# Patient Record
Sex: MALE | Race: WHITE | NOT HISPANIC OR LATINO
[De-identification: names, ages, dates, MRNs, and addresses within clinical notes are randomized per-mention and may not be internally consistent; named-entity substitution may affect disease eponyms.]

---

## 2017-05-16 ENCOUNTER — RECORDS - HEALTHEAST (OUTPATIENT)
Dept: ADMINISTRATIVE | Facility: OTHER | Age: 67
End: 2017-05-16

## 2017-05-16 ENCOUNTER — RECORDS - HEALTHEAST (OUTPATIENT)
Dept: LAB | Facility: CLINIC | Age: 67
End: 2017-05-16

## 2017-05-16 LAB
CHOLEST SERPL-MCNC: 129 MG/DL
FASTING STATUS PATIENT QL REPORTED: ABNORMAL
HDLC SERPL-MCNC: 32 MG/DL
LDLC SERPL CALC-MCNC: 72 MG/DL
TRIGL SERPL-MCNC: 127 MG/DL

## 2017-05-23 ENCOUNTER — COMMUNICATION - HEALTHEAST (OUTPATIENT)
Dept: CARDIOLOGY | Facility: HOSPITAL | Age: 67
End: 2017-05-23

## 2017-05-23 ENCOUNTER — HOSPITAL ENCOUNTER (OUTPATIENT)
Dept: NUCLEAR MEDICINE | Facility: HOSPITAL | Age: 67
Discharge: HOME | End: 2017-05-23
Attending: FAMILY MEDICINE

## 2017-05-23 ENCOUNTER — HOSPITAL ENCOUNTER (OUTPATIENT)
Dept: CARDIOLOGY | Facility: HOSPITAL | Age: 67
Discharge: HOME | End: 2017-05-23
Attending: FAMILY MEDICINE

## 2017-05-23 ENCOUNTER — OFFICE VISIT - HEALTHEAST (OUTPATIENT)
Dept: CARDIOLOGY | Facility: HOSPITAL | Age: 67
End: 2017-05-23

## 2017-05-23 DIAGNOSIS — R06.09 DOE (DYSPNEA ON EXERTION): ICD-10-CM

## 2017-05-24 ENCOUNTER — RECORDS - HEALTHEAST (OUTPATIENT)
Dept: NUCLEAR MEDICINE | Facility: HOSPITAL | Age: 67
End: 2017-05-24

## 2017-05-24 ENCOUNTER — COMMUNICATION - HEALTHEAST (OUTPATIENT)
Dept: CARDIOLOGY | Facility: CLINIC | Age: 67
End: 2017-05-24

## 2017-05-24 DIAGNOSIS — R06.09 OTHER FORMS OF DYSPNEA: ICD-10-CM

## 2017-05-24 DIAGNOSIS — I25.10 CAD (CORONARY ARTERY DISEASE): ICD-10-CM

## 2017-05-30 ENCOUNTER — AMBULATORY - HEALTHEAST (OUTPATIENT)
Dept: CARDIOLOGY | Facility: CLINIC | Age: 67
End: 2017-05-30

## 2017-05-31 ENCOUNTER — OFFICE VISIT - HEALTHEAST (OUTPATIENT)
Dept: CARDIOLOGY | Facility: CLINIC | Age: 67
End: 2017-05-31

## 2017-05-31 ENCOUNTER — COMMUNICATION - HEALTHEAST (OUTPATIENT)
Dept: CARDIOLOGY | Facility: CLINIC | Age: 67
End: 2017-05-31

## 2017-05-31 DIAGNOSIS — R06.09 DYSPNEA ON EXERTION: ICD-10-CM

## 2017-05-31 DIAGNOSIS — I48.20 CHRONIC ATRIAL FIBRILLATION (H): ICD-10-CM

## 2017-05-31 DIAGNOSIS — R07.9 CHEST PAIN, UNSPECIFIED TYPE: ICD-10-CM

## 2017-05-31 DIAGNOSIS — I10 ESSENTIAL HYPERTENSION WITH GOAL BLOOD PRESSURE LESS THAN 130/80: ICD-10-CM

## 2017-05-31 LAB
ATRIAL RATE - MUSE: NORMAL BPM
DIASTOLIC BLOOD PRESSURE - MUSE: NORMAL MMHG
INTERPRETATION ECG - MUSE: NORMAL
P AXIS - MUSE: NORMAL DEGREES
PR INTERVAL - MUSE: NORMAL MS
QRS DURATION - MUSE: 138 MS
QT - MUSE: 390 MS
QTC - MUSE: 479 MS
R AXIS - MUSE: -48 DEGREES
SYSTOLIC BLOOD PRESSURE - MUSE: NORMAL MMHG
T AXIS - MUSE: -40 DEGREES
VENTRICULAR RATE- MUSE: 91 BPM

## 2017-05-31 ASSESSMENT — MIFFLIN-ST. JEOR: SCORE: 1731.05

## 2017-06-01 ENCOUNTER — HOSPITAL ENCOUNTER (OUTPATIENT)
Dept: CARDIOLOGY | Facility: HOSPITAL | Age: 67
Discharge: HOME | End: 2017-06-01
Attending: NURSE PRACTITIONER

## 2017-06-01 DIAGNOSIS — R06.09 DYSPNEA ON EXERTION: ICD-10-CM

## 2017-06-01 DIAGNOSIS — I48.20 CHRONIC ATRIAL FIBRILLATION (H): ICD-10-CM

## 2017-06-01 DIAGNOSIS — R06.09 OTHER FORMS OF DYSPNEA: ICD-10-CM

## 2017-06-06 ENCOUNTER — AMBULATORY - HEALTHEAST (OUTPATIENT)
Dept: CARDIOLOGY | Facility: CLINIC | Age: 67
End: 2017-06-06

## 2017-06-06 ENCOUNTER — OFFICE VISIT - HEALTHEAST (OUTPATIENT)
Dept: CARDIOLOGY | Facility: CLINIC | Age: 67
End: 2017-06-06

## 2017-06-06 DIAGNOSIS — E78.5 DYSLIPIDEMIA: ICD-10-CM

## 2017-06-06 DIAGNOSIS — I48.0 PAROXYSMAL ATRIAL FIBRILLATION (H): ICD-10-CM

## 2017-06-06 DIAGNOSIS — R07.2 PRECORDIAL PAIN: ICD-10-CM

## 2017-06-06 DIAGNOSIS — R06.09 DYSPNEA ON EXERTION: ICD-10-CM

## 2017-06-06 DIAGNOSIS — I10 ESSENTIAL HYPERTENSION WITH GOAL BLOOD PRESSURE LESS THAN 140/90: ICD-10-CM

## 2017-06-06 ASSESSMENT — MIFFLIN-ST. JEOR: SCORE: 1734.22

## 2017-06-07 ENCOUNTER — SURGERY - HEALTHEAST (OUTPATIENT)
Dept: CARDIOLOGY | Facility: CLINIC | Age: 67
End: 2017-06-07

## 2017-06-07 ENCOUNTER — AMBULATORY - HEALTHEAST (OUTPATIENT)
Dept: CARDIOLOGY | Facility: CLINIC | Age: 67
End: 2017-06-07

## 2017-06-14 ENCOUNTER — SURGERY - HEALTHEAST (OUTPATIENT)
Dept: CARDIOLOGY | Facility: CLINIC | Age: 67
End: 2017-06-14

## 2017-06-14 ENCOUNTER — AMBULATORY - HEALTHEAST (OUTPATIENT)
Dept: CARDIOLOGY | Facility: CLINIC | Age: 67
End: 2017-06-14

## 2017-06-14 ENCOUNTER — COMMUNICATION - HEALTHEAST (OUTPATIENT)
Dept: CARDIOLOGY | Facility: CLINIC | Age: 67
End: 2017-06-14

## 2017-06-14 DIAGNOSIS — I48.19 PERSISTENT ATRIAL FIBRILLATION (H): ICD-10-CM

## 2017-06-14 DIAGNOSIS — I48.91 ATRIAL FIBRILLATION (H): ICD-10-CM

## 2017-06-14 DIAGNOSIS — I25.10 CORONARY ARTERY ARTERIOSCLEROSIS: ICD-10-CM

## 2017-06-14 ASSESSMENT — MIFFLIN-ST. JEOR: SCORE: 1744.2

## 2017-06-16 ENCOUNTER — OFFICE VISIT - HEALTHEAST (OUTPATIENT)
Dept: CARDIOLOGY | Facility: CLINIC | Age: 67
End: 2017-06-16

## 2017-06-16 ENCOUNTER — AMBULATORY - HEALTHEAST (OUTPATIENT)
Dept: CARDIOLOGY | Facility: CLINIC | Age: 67
End: 2017-06-16

## 2017-06-16 DIAGNOSIS — L50.9 FULL BODY HIVES: ICD-10-CM

## 2017-06-16 DIAGNOSIS — I48.91 ATRIAL FIBRILLATION (H): ICD-10-CM

## 2017-06-16 LAB
ATRIAL RATE - MUSE: 101 BPM
DIASTOLIC BLOOD PRESSURE - MUSE: NORMAL MMHG
INTERPRETATION ECG - MUSE: NORMAL
P AXIS - MUSE: NORMAL DEGREES
PR INTERVAL - MUSE: NORMAL MS
QRS DURATION - MUSE: 136 MS
QT - MUSE: 354 MS
QTC - MUSE: 444 MS
R AXIS - MUSE: -62 DEGREES
SYSTOLIC BLOOD PRESSURE - MUSE: NORMAL MMHG
T AXIS - MUSE: -54 DEGREES
VENTRICULAR RATE- MUSE: 95 BPM

## 2017-06-16 ASSESSMENT — MIFFLIN-ST. JEOR
SCORE: 1752.37
SCORE: 1752.37

## 2017-07-06 ENCOUNTER — COMMUNICATION - HEALTHEAST (OUTPATIENT)
Dept: CARDIOLOGY | Facility: CLINIC | Age: 67
End: 2017-07-06

## 2017-07-06 ENCOUNTER — OFFICE VISIT - HEALTHEAST (OUTPATIENT)
Dept: CARDIOLOGY | Facility: CLINIC | Age: 67
End: 2017-07-06

## 2017-07-06 DIAGNOSIS — I48.19 PERSISTENT ATRIAL FIBRILLATION (H): ICD-10-CM

## 2017-07-06 DIAGNOSIS — I10 ESSENTIAL HYPERTENSION WITH GOAL BLOOD PRESSURE LESS THAN 140/90: ICD-10-CM

## 2017-07-06 DIAGNOSIS — I25.10 CORONARY ARTERY DISEASE INVOLVING NATIVE CORONARY ARTERY OF NATIVE HEART WITHOUT ANGINA PECTORIS: ICD-10-CM

## 2017-07-06 ASSESSMENT — MIFFLIN-ST. JEOR: SCORE: 1752.37

## 2017-07-07 ENCOUNTER — AMBULATORY - HEALTHEAST (OUTPATIENT)
Dept: CARDIOLOGY | Facility: CLINIC | Age: 67
End: 2017-07-07

## 2017-07-13 ENCOUNTER — ANESTHESIA - HEALTHEAST (OUTPATIENT)
Dept: CARDIOLOGY | Facility: CLINIC | Age: 67
End: 2017-07-13

## 2017-07-21 ENCOUNTER — COMMUNICATION - HEALTHEAST (OUTPATIENT)
Dept: CARDIOLOGY | Facility: CLINIC | Age: 67
End: 2017-07-21

## 2017-07-25 ENCOUNTER — HOSPITAL ENCOUNTER (OUTPATIENT)
Dept: CARDIOLOGY | Facility: HOSPITAL | Age: 67
Discharge: HOME | End: 2017-07-25
Attending: INTERNAL MEDICINE

## 2017-07-25 DIAGNOSIS — I48.19 PERSISTENT ATRIAL FIBRILLATION (H): ICD-10-CM

## 2017-08-10 ENCOUNTER — COMMUNICATION - HEALTHEAST (OUTPATIENT)
Dept: CARDIOLOGY | Facility: CLINIC | Age: 67
End: 2017-08-10

## 2017-08-11 ENCOUNTER — OFFICE VISIT - HEALTHEAST (OUTPATIENT)
Dept: CARDIOLOGY | Facility: CLINIC | Age: 67
End: 2017-08-11

## 2017-08-11 DIAGNOSIS — I48.19 PERSISTENT ATRIAL FIBRILLATION (H): ICD-10-CM

## 2017-08-11 DIAGNOSIS — E78.5 DYSLIPIDEMIA: ICD-10-CM

## 2017-08-11 DIAGNOSIS — I25.10 CORONARY ARTERY DISEASE INVOLVING NATIVE CORONARY ARTERY OF NATIVE HEART WITHOUT ANGINA PECTORIS: ICD-10-CM

## 2017-08-11 DIAGNOSIS — I10 ESSENTIAL HYPERTENSION WITH GOAL BLOOD PRESSURE LESS THAN 140/90: ICD-10-CM

## 2017-08-11 ASSESSMENT — MIFFLIN-ST. JEOR: SCORE: 1775.05

## 2017-08-24 ENCOUNTER — COMMUNICATION - HEALTHEAST (OUTPATIENT)
Dept: CARDIOLOGY | Facility: CLINIC | Age: 67
End: 2017-08-24

## 2017-08-24 DIAGNOSIS — I25.10 CORONARY ARTERY ARTERIOSCLEROSIS: ICD-10-CM

## 2017-08-24 DIAGNOSIS — I48.19 PERSISTENT ATRIAL FIBRILLATION (H): ICD-10-CM

## 2017-10-19 ENCOUNTER — COMMUNICATION - HEALTHEAST (OUTPATIENT)
Dept: CARDIOLOGY | Facility: CLINIC | Age: 67
End: 2017-10-19

## 2017-10-19 ENCOUNTER — OFFICE VISIT - HEALTHEAST (OUTPATIENT)
Dept: CARDIOLOGY | Facility: CLINIC | Age: 67
End: 2017-10-19

## 2017-10-19 DIAGNOSIS — I10 ESSENTIAL HYPERTENSION: ICD-10-CM

## 2017-10-19 DIAGNOSIS — I48.19 PERSISTENT ATRIAL FIBRILLATION (H): ICD-10-CM

## 2017-10-19 DIAGNOSIS — G47.33 OBSTRUCTIVE SLEEP APNEA ON CPAP: ICD-10-CM

## 2017-10-19 DIAGNOSIS — I25.10 CORONARY ARTERY DISEASE INVOLVING NATIVE CORONARY ARTERY OF NATIVE HEART WITHOUT ANGINA PECTORIS: ICD-10-CM

## 2017-10-19 LAB
ATRIAL RATE - MUSE: 50 BPM
DIASTOLIC BLOOD PRESSURE - MUSE: NORMAL MMHG
INTERPRETATION ECG - MUSE: NORMAL
P AXIS - MUSE: 4 DEGREES
PR INTERVAL - MUSE: 162 MS
QRS DURATION - MUSE: 124 MS
QT - MUSE: 534 MS
QTC - MUSE: 486 MS
R AXIS - MUSE: -43 DEGREES
SYSTOLIC BLOOD PRESSURE - MUSE: NORMAL MMHG
T AXIS - MUSE: -28 DEGREES
VENTRICULAR RATE- MUSE: 50 BPM

## 2017-10-19 RX ORDER — SOTALOL HYDROCHLORIDE 120 MG/1
TABLET ORAL
Qty: 90 TABLET | Refills: 3 | Status: SHIPPED | OUTPATIENT
Start: 2017-10-19

## 2017-10-19 ASSESSMENT — MIFFLIN-ST. JEOR: SCORE: 1770.51

## 2017-11-10 ENCOUNTER — OFFICE VISIT - HEALTHEAST (OUTPATIENT)
Dept: CARDIOLOGY | Facility: CLINIC | Age: 67
End: 2017-11-10

## 2017-11-10 DIAGNOSIS — I48.19 PERSISTENT ATRIAL FIBRILLATION (H): ICD-10-CM

## 2017-11-10 RX ORDER — GLIMEPIRIDE 4 MG/1
1 TABLET ORAL DAILY
Status: SHIPPED | COMMUNITY
Start: 2017-11-09

## 2017-11-10 ASSESSMENT — MIFFLIN-ST. JEOR: SCORE: 1770.51

## 2018-03-19 ENCOUNTER — RECORDS - HEALTHEAST (OUTPATIENT)
Dept: ADMINISTRATIVE | Facility: OTHER | Age: 68
End: 2018-03-19

## 2018-04-19 ENCOUNTER — COMMUNICATION - HEALTHEAST (OUTPATIENT)
Dept: CARDIOLOGY | Facility: CLINIC | Age: 68
End: 2018-04-19

## 2018-04-19 DIAGNOSIS — I25.10 CORONARY ARTERY ARTERIOSCLEROSIS: ICD-10-CM

## 2018-07-24 ENCOUNTER — COMMUNICATION - HEALTHEAST (OUTPATIENT)
Dept: CARDIOLOGY | Facility: CLINIC | Age: 68
End: 2018-07-24

## 2018-07-24 DIAGNOSIS — I25.10 CORONARY ARTERY ARTERIOSCLEROSIS: ICD-10-CM

## 2018-07-25 ENCOUNTER — COMMUNICATION - HEALTHEAST (OUTPATIENT)
Dept: CARDIOLOGY | Facility: CLINIC | Age: 68
End: 2018-07-25

## 2018-07-25 DIAGNOSIS — I25.10 CORONARY ARTERY ARTERIOSCLEROSIS: ICD-10-CM

## 2018-07-25 RX ORDER — ATORVASTATIN CALCIUM 40 MG/1
40 TABLET, FILM COATED ORAL AT BEDTIME
Qty: 90 TABLET | Refills: 1 | Status: SHIPPED | OUTPATIENT
Start: 2018-07-25

## 2018-07-25 RX ORDER — ATORVASTATIN CALCIUM 40 MG/1
TABLET, FILM COATED ORAL
Qty: 90 TABLET | Refills: 1 | Status: SHIPPED | OUTPATIENT
Start: 2018-07-25

## 2018-08-31 ENCOUNTER — OFFICE VISIT - HEALTHEAST (OUTPATIENT)
Dept: CARDIOLOGY | Facility: CLINIC | Age: 68
End: 2018-08-31

## 2018-08-31 DIAGNOSIS — G47.33 OBSTRUCTIVE SLEEP APNEA ON CPAP: ICD-10-CM

## 2018-08-31 DIAGNOSIS — I48.19 PERSISTENT ATRIAL FIBRILLATION (H): ICD-10-CM

## 2018-08-31 DIAGNOSIS — I25.10 CORONARY ARTERY DISEASE INVOLVING NATIVE CORONARY ARTERY OF NATIVE HEART WITHOUT ANGINA PECTORIS: ICD-10-CM

## 2018-08-31 DIAGNOSIS — I10 ESSENTIAL HYPERTENSION: ICD-10-CM

## 2018-08-31 DIAGNOSIS — E78.5 DYSLIPIDEMIA: ICD-10-CM

## 2018-08-31 RX ORDER — GABAPENTIN 300 MG/1
300 CAPSULE ORAL 3 TIMES DAILY
Status: SHIPPED | COMMUNITY
Start: 2018-08-31

## 2018-08-31 ASSESSMENT — MIFFLIN-ST. JEOR: SCORE: 1779.58

## 2018-10-24 ENCOUNTER — COMMUNICATION - HEALTHEAST (OUTPATIENT)
Dept: CARDIOLOGY | Facility: CLINIC | Age: 68
End: 2018-10-24

## 2018-10-24 DIAGNOSIS — I25.10 CORONARY ARTERY ARTERIOSCLEROSIS: ICD-10-CM

## 2019-08-01 ENCOUNTER — COMMUNICATION - HEALTHEAST (OUTPATIENT)
Dept: CARDIOLOGY | Facility: CLINIC | Age: 69
End: 2019-08-01

## 2019-08-01 DIAGNOSIS — I25.10 CORONARY ARTERY ARTERIOSCLEROSIS: ICD-10-CM

## 2019-08-01 RX ORDER — ATORVASTATIN CALCIUM 40 MG/1
TABLET, FILM COATED ORAL
Qty: 90 TABLET | Refills: 0 | Status: SHIPPED | OUTPATIENT
Start: 2019-08-01

## 2021-05-27 ENCOUNTER — RECORDS - HEALTHEAST (OUTPATIENT)
Dept: ADMINISTRATIVE | Facility: CLINIC | Age: 71
End: 2021-05-27

## 2021-05-29 ENCOUNTER — RECORDS - HEALTHEAST (OUTPATIENT)
Dept: ADMINISTRATIVE | Facility: CLINIC | Age: 71
End: 2021-05-29

## 2021-05-31 VITALS — WEIGHT: 220.2 LBS | HEIGHT: 69 IN | BODY MASS INDEX: 32.61 KG/M2

## 2021-05-31 VITALS — BODY MASS INDEX: 32.88 KG/M2 | HEIGHT: 69 IN | WEIGHT: 222 LBS

## 2021-05-31 VITALS — HEIGHT: 69 IN | BODY MASS INDEX: 32.19 KG/M2 | WEIGHT: 217.3 LBS

## 2021-05-31 VITALS — HEIGHT: 69 IN | WEIGHT: 226 LBS | BODY MASS INDEX: 33.47 KG/M2

## 2021-05-31 VITALS — WEIGHT: 222 LBS | HEIGHT: 69 IN | BODY MASS INDEX: 32.88 KG/M2

## 2021-05-31 VITALS — HEIGHT: 69 IN | WEIGHT: 218 LBS | BODY MASS INDEX: 32.29 KG/M2

## 2021-05-31 VITALS — HEIGHT: 69 IN | WEIGHT: 222 LBS | BODY MASS INDEX: 32.88 KG/M2

## 2021-05-31 VITALS — BODY MASS INDEX: 33.47 KG/M2 | HEIGHT: 69 IN | WEIGHT: 226 LBS

## 2021-05-31 VITALS — WEIGHT: 227 LBS | HEIGHT: 69 IN | BODY MASS INDEX: 33.62 KG/M2

## 2021-06-01 VITALS — HEIGHT: 69 IN | BODY MASS INDEX: 33.77 KG/M2 | WEIGHT: 228 LBS

## 2021-06-11 NOTE — ANESTHESIA PREPROCEDURE EVALUATION
Anesthesia Evaluation      Patient summary reviewed   No history of anesthetic complications     Airway   Mallampati: II  Neck ROM: full   Pulmonary - normal exam    breath sounds clear to auscultation  (+) shortness of breath, sleep apnea,                          Cardiovascular - normal exam  (+) hypertension, CAD, dysrhythmias, ,     ECG reviewed        Neuro/Psych      Endo/Other    (+) diabetes mellitus, obesity,      GI/Hepatic/Renal    (+) GERD,             Dental - normal exam                        Anesthesia Plan  Planned anesthetic: general mask    ASA 3   Induction: intravenous   Anesthetic plan and risks discussed with: patient  Anesthesia plan special considerations: antiemetics,   Post-op plan: routine recovery

## 2021-06-11 NOTE — ANESTHESIA CARE TRANSFER NOTE
Last vitals:   Vitals:    07/13/17 1323   BP: 122/82   Pulse: 68   Resp: 17   Temp:    SpO2: 100%     Patient's level of consciousness is drowsy  Spontaneous respirations: yes  Maintains airway independently: yes  Dentition unchanged: yes  Oropharynx: oropharynx clear of all foreign objects    QCDR Measures:  ASA# 20 - Surgical Safety Checklist: ASA20A - Safety Checks Done  PQRS# 430 - Adult PONV Prevention: NA - Not adult patient, not GA or 3 or more risk factors NOT present  ASA# 8 - Peds PONV Prevention: NA - Not pediatric patient, not GA or 2 or more risk factors NOT present  PQRS# 424 - Rachel-op Temp Management: NA - MAC anesthesia or case < 60 minutes  PQRS# 426 - PACU Transfer Protocol:NA - Patient did not go to PACU  ASA# 14 - Acute Post-op Pain: ASA14B - Patient did NOT experience pain >= 7 out of 10

## 2021-06-11 NOTE — PROGRESS NOTES
[facility]--Electrophysiology    Assessment/Plan:      Problem List Items Addressed This Visit     Hypertension    Relevant Medications    sotalol (BETAPACE) 80 MG tablet    Atrial Fibrillation - Primary    Relevant Medications    sotalol (BETAPACE) 80 MG tablet    Other Relevant Orders    EP Cardioversion External    Coronary artery disease involving native coronary artery of native heart without angina pectoris    Relevant Medications    sotalol (BETAPACE) 80 MG tablet        1.  Persistent atrial fibrillation: Onset not entirely known, but may have been chronic since 2013.  Ventricular response elevated today.  He had some symptomatic relief when ventricular rates were controlled, but continued to have symptoms of fatigue, dyspnea on exertion and chest discomfort.  We had a lengthy discussion of the physiology and natural progression of atrial fibrillation including treatment options of rate control versus rhythm control depending upon the presence of symptoms.  He will undergo cardioversion for a trial in sinus rhythm for symptom clarification.  We discussed the cardioversion procedure, risks, expected recovery, and follow-up.  Left atrial enlargement on echocardiogram is consistent with a longer history of atrial fibrillation, so sotalol 40 mg twice daily is not likely to be effective.  Increase sotalol to 80 mg twice daily 2 days prior to cardioversion.  He is instructed to not take diltiazem the morning of cardioversion.  Discussed the possible need for overnight observation or a pacemaker, as he has a history of sinus bradycardia, particularly if he has been in atrial fibrillation for years.    He has a [id number] score of 4 for diabetes, hypertension, CAD, and age 65-74.  He is on long-term anticoagulation with Pradaxa 150 mg twice daily.  He has now been taking it directly from the 's packaging and has not missed any doses.  This was restarted on 6/17/2017, so he will be eligible  "for cardioversion after 7/10/2017.    2.  Hypertension: Blood pressure elevated today, so is his heart rate.  Reevaluate at the time of cardioversion.    3.  Coronary artery disease: Mild to moderate nonobstructive CAD seen on cardiac catheterization.  Symptoms are not consistent with degree of coronary disease.    Follow-up with Dr. Miller on 8/11/17.  Follow-up with me in approximately 3 months.    Subjective:      Yo Mcdonough, a very pleasant 66-year-old gentleman, comes in today for follow-up of atrial fibrillation.  He has a history of atrial fibrillation initially diagnosed in 2013 at which time he was determined to be asymptomatic; it appears that he has remained in atrial fibrillation since that time, but it is not entirely clear.  Initial workup included echo and stress test which were both normal.  He also has a history of type 2 diabetes, hypertension, hyperlipidemia, and Reyes's esophagus.  He has had complaints of generalized weakness, fatigue, dyspnea on exertion, and chest tightness for the past 3 months.  Symptoms were progressive, though now he reports that symptoms are less severe.  He was able to chop wood with his wife for 2 hours, but then was \"totally wiped out\".  This is significantly different from his activity tolerance a year ago.  He reports symptoms worsen with activity, but also occur sometimes at rest.  He states that his shortness of breath is better when laying down.  He denies palpitations, abdominal fullness/bloating or peripheral edema, paroxysmal nocturnal dyspnea, orthopnea, lightheadedness, dizziness, pre-syncope, or syncope.  He was started on diltiazem 120 mg daily with improved rate control and lessening of symptoms.  He underwent coronary angiogram that showed mild to moderate CAD.  Echocardiogram showed normal left ventricular systolic performance with an EF of 50-55% and no significant valvular disease, though left atrium was moderately enlarged.  After his " angiogram, he was started on sotalol 40 mg twice daily.  Pradaxa was restarted on 6/17/2017.    Medical, surgical, family, social history, and medications were reviewed and updated as necessary.  After coronary angiogram he developed a bright red rash in his perineum bilateral thighs and around his buttocks, essentially in a boxer brief pattern.  He reports the skin sloughed off and has now returned to normal.    Patient Active Problem List   Diagnosis     Diabetes Mellitus     Dyslipidemia     Alcoholism     Depression     Hypertension     Atrial Fibrillation     Reyes's Esophagus     Precordial pain     Dyspnea on exertion     Coronary artery disease involving native coronary artery of native heart without angina pectoris       Past Medical History:   Diagnosis Date     Atrial fibrillation      Reyes's esophagus      Diabetes mellitus, type II      GERD (gastroesophageal reflux disease)      High cholesterol      Hypertension      Sleep apnea, obstructive     CPAP       Past Surgical History:   Procedure Laterality Date     CARDIAC CATHETERIZATION  06/14/2017    right and left heart cath     CARDIAC CATHETERIZATION N/A 6/14/2017    Procedure: Coronary Angiogram;  Surgeon: Ana Rosa Miller MD;  Location: [facility] Cath Lab;  Service:      CARDIOVERSION       OR ESOPHAGOSCOPY FLEXIBLE TRANSORAL WITH BIOPSY      Description: Esophagoscopy With Biopsy;  Recorded: 07/24/2013;     OR L HRT CATH W/NJX L VENTRICULOGRAPHY IMG S&I N/A 6/14/2017    Procedure: Left Heart Catheterization with Left Ventriculogram;  Surgeon: Ana Rosa Miller MD;  Location: [facility] Cath Lab;  Service: Cardiology     OR RIGHT HEART CATH O2 SATURATION & CARDIAC OUTPUT N/A 6/14/2017    Procedure: Right Heart Catheterization;  Surgeon: Ana Rosa Miller MD;  Location: [facility] Cath Lab;  Service: Cardiology       No Known Allergies    Current Outpatient Prescriptions   Medication Sig Dispense Refill     acetaminophen-codeine (TYLENOL #3)  "300-30 mg per tablet Take 1 tablet by mouth every 4 (four) hours as needed for pain.       atorvastatin (LIPITOR) 40 MG tablet TAKE 1 TABLET(40 MG) BY MOUTH AT BEDTIME 90 tablet 1     canagliflozin (INVOKANA) 100 mg Tab Take 100 mg by mouth Daily before breakfast.        CARTIA  mg 24 hr capsule TAKE 1 CAPSULE(120 MG) BY MOUTH DAILY 90 capsule 3     dabigatran etexilate (PRADAXA) 150 mg capsu Take 150 mg by mouth 2 (two) times a day.       lisinopril-hydrochlorothiazide (PRINZIDE,ZESTORETIC) 10-12.5 mg per tablet Take 1 tablet by mouth daily.       metFORMIN (GLUCOPHAGE) 1000 MG tablet Take 1,000 mg by mouth 2 (two) times a day with meals.       omeprazole (PRILOSEC) 20 MG capsule Take 20 mg by mouth Daily before breakfast.       sotalol (BETAPACE) 80 MG tablet Take 1 tablet (80 mg total) by mouth 2 (two) times a day. 60 tablet 6     traZODone (DESYREL) 150 MG tablet Take 150 mg by mouth at bedtime.       No current facility-administered medications for this visit.        Family History   Problem Relation Age of Onset     Valvular heart disease Father      Pacemaker Father        Social History   Substance Use Topics     Smoking status: Former Smoker     Packs/day: 0.50     Years: 50.00     Quit date: 2004     Smokeless tobacco: Current User     Alcohol use No       Review of Systems:   General: WNL  Eyes: WNL  Ears/Nose/Throat: WNL  Lungs: WNL  Heart: WNL  Stomach: WNL  Bladder: WNL  Muscle/Joints: WNL  Skin: WNL  Nervous System: WNL  Mental Health: WNL     Blood: WNL      Objective:    BP (!) 148/94 (Patient Site: Right Arm, Patient Position: Sitting, Cuff Size: Adult Large)  Pulse (!) 130  Resp 18  Ht 5' 9\" (1.753 m)  Wt 222 lb (100.7 kg)  BMI 32.78 kg/m2    Physical Exam  General Appearance:  Alert, well-appearing, in no acute distress.     HEENT:  Atraumatic, normocephalic; no scleral icterus, EOM intact, PERRL; the mucous membranes were pink and moist.   Chest: Chest symmetric, spine straight.   "   Lungs:   Respirations unlabored; the lungs are clear to auscultation.   Cardiovascular:   Auscultation reveals normal first and second heart sounds with no murmurs, rubs, or gallops.  Irregularly irregular rate and rhythm. No JVD.  Radial and posterior tibial pulses are intact.    Abdomen:  Soft, nontender, nondistended, bowel sounds present.     Extremities: No cyanosis, clubbing, or edema.   Musculoskeletal:  Moves all extremities.     Skin: No xanthelasma.   Neurologic: Mood and affect are appropriate. Oriented to person, place, time, and situation.       Cardiographics  ECG 2017 it was personally reviewed, shows atrial fibrillation with mildly elevated ventricular response at 95 bpm, right bundle branch block, QT interval measures 354 ms.    EC17 was personally reviewed, shows atrial fibrillation with mildly elevated ventricular response at 91 bpm and a right bundle branch block.  This is essentially unchanged from the ECG done on 2017 that showed atrial fibrillation with ventricular rate of 96 bpm and RBBB.    24 hour Holter monitor worn 2017 persistent atrial fibrillation with controlled ventricular response, average ventricular rate was 78 bpm with a range of  bpm.  Average daytime rates between 80 and 100 bpm.  The QRS demonstrated a right bundle branch block with normal QT interval.  No significant pauses or bradycardia.  Rare ventricular ectopy consisting of 2 isolated PVCs.  Symptoms correlating with A. fib with controlled ventricular rates.    Echocardiogram done 2017  1. Normal left ventricular size and systolic performance with a visually estimated ejection fraction of 50-55%.   2. There is mild concentric increase in left ventricular wall thickness.   3. No significant valvular heart disease is identified on this study.   4. There is mild right ventricular enlargement. Right ventricular systolic performance is mildly reduced.   5. There is moderate left atrial  enlargement. There is mild right atrial enlargement.     NM stress test scheduled for 5/23/17 was cancelled due to A fib with -130s bpm.  Attempt to repeat stress test 5/24/17 was also cancelled due to A fib with -155 bpm.    Cardiac catheterization done 6/14/2017    PCWP 17 mmHg, PA 34/20, RV 34/11, RA 14 mmHg. LV EDP 10 mmHg.    CO 3.6 and 4.4 l/min (Félix and TD respectively)    Diffuse coronary artery calcification. Moderate in mid RCA without flow limiting FFR in the distal RCA.    Rate controlled atrial fibrillation thruought the procedure.      Lab Review   Lab Results   Component Value Date    CREATININE 1.37 (H) 06/14/2017    BUN 17 06/14/2017     06/14/2017    K 4.2 06/14/2017     (H) 06/14/2017    CO2 22 06/14/2017     Lab Results   Component Value Date    BNP 76 (H) 05/23/2017     Lab Results   Component Value Date    WBC 5.6 06/14/2017    HGB 11.9 (L) 06/14/2017    HCT 34.9 (L) 06/14/2017    MCV 95 06/14/2017     (L) 06/14/2017         Greater than 35 minutes were spent face to face in this visit with more than 50% of the time discussing diagnoses as listed above, counseling, and coordination of care.      Sole Resendez, [facility], Electrophysiology  [phone]    This note has been dictated using voice recognition software. Any grammatical, typographical, or context distortions are unintentional and inherent to the software.

## 2021-06-11 NOTE — PROGRESS NOTES
[facility]--Electrophysiology    Assessment/Plan:      Problem List Items Addressed This Visit     Hypertension    Relevant Medications    diltiazem (CARDIZEM CD) 120 MG 24 hr capsule    Atrial Fibrillation - Primary    Relevant Medications    diltiazem (CARDIZEM CD) 120 MG 24 hr capsule    Other Relevant Orders    Holter Monitor    Echo Complete    ECG 12 lead with MUSE (Completed)    Chest pain    Relevant Orders    Echo Complete    Dyspnea on exertion    Relevant Orders    Holter Monitor    Echo Complete        1.  Chronic atrial fibrillation: Atrial fibrillation has been chronic since 2013.  Ventricular response appears fairly well controlled when at rest, but I suspect that elevates quickly with any level of activity as he has presented for stress test on 2 recent occasions with significant RVR.  His blood pressure will likely not tolerate increasing his metoprolol, so start diltiazem 120 mg daily.  Decrease simvastatin to 10 mg daily because of potential interaction with diltiazem.  Consider changing this to pravastatin or atorvastatin if he is going to remain on diltiazem long-term.  24-hour Holter monitor to evaluate heart rates.    It is likely that symptoms of dyspnea on exertion and chest discomfort are related to rapid ventricular response, however cannot rule out myocardial ischemia.  Symptoms do not appear to be related to acute fluid overload despite sensation of abdominal fullness and shortness of breath, as symptoms actually improve when he is laying down and BNP was normal.  ECG done today is unchanged from previous.  Echocardiogram to further evaluate cardiac structure and function.  NM stress test as soon as ventricular rates are controlled.    He has a QCI3KU1-SLAi score of at least 3 for diabetes, hypertension, and age 65-74.  He is on long-term anticoagulation with Pradaxa 150 mg twice daily.  However, he is not taking this medication directly from its 's packaging at  the time of his dose, thus decreasing its effectiveness.   We had a very long discussion about the importance of taking this medication correctly for stroke prophylaxis.  My partner Dale Kaur ELAINE had this same discussion with him 4 years ago.  If he cannot reliably take this medication as directed, consider switching to a different OAC.    2.  Hypertension: Blood pressure on the lower side of target today associated with orthostatic lightheadedness.  He reports that his blood pressures have been higher at home and lightheadedness is infrequent.  Do not recommend any changes in his metoprolol or lisinopril/HCTZ at this time.    Follow-up with Dr. Miller on 6/6/2017 as previously scheduled.    Subjective:      Yo Mcdonough, a very pleasant 66-year-old gentleman, comes in today accompanied by his wife for follow-up of atrial fibrillation.  He has a history of chronic atrial fibrillation initially diagnosed in 2013 time he was determined to be asymptomatic; he has remained in atrial fibrillation since that time.  Initial workup included echo and stress test which were both normal.  He also has a history of type 2 diabetes, hypertension, hyperlipidemia, and Reyes's esophagus.  He has had complaints of generalized weakness, fatigue, dyspnea on exertion, and chest tightness for the past 3 months, though symptoms have significantly worsened over the past couple of weeks.  He also reports pain in his shoulders that radiates down his arms as well as pain in his jaw that makes chewing difficult at times.  He reports symptoms worsen with activity, but also occur sometimes at rest.  He states that his shortness of breath is better when laying down.  He denies any peripheral edema, but has noted some sensation of abdominal fullness.He denies paroxysmal nocturnal dyspnea, orthopnea, lightheadedness, dizziness, pre-syncope, or syncope.  He was scheduled for a stress test, though this was canceled as he arrived in  atrial fibrillation with rapid ventricular response 100-130s.  He did not take his metoprolol tartrate 25 mg that morning.  He was evaluated in the emergency department where troponin was negative, BNP was normal, and PE was ruled out.  His metoprolol dose was increased to 50 mg twice daily.  Attempt at repeating the stress test the next day was also canceled due to A. fib with -155 bpm.  He reports that he took metoprolol 25 mg, but did not have enough for the increased dose.    Medical, surgical, family, social history, and medications were reviewed and updated as necessary.    Patient Active Problem List   Diagnosis     Diabetes Mellitus     Hyperlipidemia     Alcoholism     Depression     Hypertension     Atrial Fibrillation     Reyes's Esophagus     Chest pain     Dyspnea on exertion       Past Medical History:   Diagnosis Date     Atrial fibrillation      Reyes's esophagus      Diabetes mellitus, type II      GERD (gastroesophageal reflux disease)      High cholesterol      Hypertension      Sleep apnea, obstructive     CPAP       Past Surgical History:   Procedure Laterality Date     CARDIOVERSION       RI ESOPHAGOSCOPY FLEXIBLE TRANSORAL WITH BIOPSY      Description: Esophagoscopy With Biopsy;  Recorded: 07/24/2013;       No Known Allergies    Current Outpatient Prescriptions   Medication Sig Dispense Refill     acetaminophen-codeine (TYLENOL #3) 300-30 mg per tablet Take 1 tablet by mouth every 4 (four) hours as needed for pain.       canagliflozin (INVOKANA) 100 mg Tab Take by mouth Daily before breakfast.       dabigatran etexilate (PRADAXA) 150 mg capsu Take 150 mg by mouth 2 (two) times a day.       lisinopril-hydrochlorothiazide (PRINZIDE,ZESTORETIC) 10-12.5 mg per tablet Take 1 tablet by mouth daily.       metFORMIN (GLUCOPHAGE) 1000 MG tablet Take 1,000 mg by mouth 2 (two) times a day with meals.       metoprolol tartrate (LOPRESSOR) 50 MG tablet TAKE 1 TABLET BY MOUTH TWICE DAILY 180  "tablet 0     omeprazole (PRILOSEC) 20 MG capsule Take 20 mg by mouth Daily before breakfast.       simvastatin (ZOCOR) 20 MG tablet Take 10 mg by mouth at bedtime.       traZODone (DESYREL) 150 MG tablet Take 150 mg by mouth at bedtime.       diltiazem (CARDIZEM CD) 120 MG 24 hr capsule Take 1 capsule (120 mg total) by mouth daily. 30 capsule 6     No current facility-administered medications for this visit.        Family History   Problem Relation Age of Onset     Valvular heart disease Father      Pacemaker Father        Social History   Substance Use Topics     Smoking status: Former Smoker     Packs/day: 0.50     Years: 50.00     Quit date: 2000     Smokeless tobacco: None     Alcohol use 7.2 oz/week     12 Cans of beer per week       Review of Systems:   General: WNL  Eyes: WNL  Ears/Nose/Throat: Hearing Loss  Lungs: Shortness of Breath  Heart: Chest Pain, Shortness of Breath with activity, Irregular Heartbeat (palpitations)  Stomach: WNL  Bladder: WNL  Muscle/Joints: Muscle Weakness, Muscle Pain  Skin: WNL  Nervous System: Dizziness  Mental Health: WNL     Blood: Easy Bleeding      Objective:    /60 (Patient Site: Left Arm, Patient Position: Sitting, Cuff Size: Adult Regular)  Pulse 84  Resp 20  Ht 5' 9\" (1.753 m)  Wt 217 lb 4.8 oz (98.6 kg)  BMI 32.09 kg/m2    Physical Exam  General Appearance:  Alert, well-appearing, in no acute distress.     HEENT:  Atraumatic, normocephalic; no scleral icterus, EOM intact, PERRL; the mucous membranes were pink and moist; no cervical bruits or obvious thyromegaly.   Chest: Chest symmetric, spine straight.     Lungs:   Respirations unlabored; the lungs are clear to auscultation.   Cardiovascular:   Auscultation reveals normal first and second heart sounds with no murmurs, rubs, or gallops.  Irregularly irregular rate and rhythm. No JVD.  Radial and posterior tibial pulses are intact.    Abdomen:  Soft, nontender, nondistended, bowel sounds present.   "   Extremities: No cyanosis, clubbing, or edema.   Musculoskeletal:  Moves all extremities.     Skin: No xanthelasma.   Neurologic: Mood and affect are appropriate. Oriented to person, place, time, and situation.       Cardiographics  EC17 was personally reviewed, shows atrial fibrillation with mildly elevated ventricular response at 91 bpm and a right bundle branch block.  This is essentially unchanged from the ECG done on 2017 that showed atrial fibrillation with ventricular rate of 96 bpm and RBBB.    NM stress test scheduled for 17 was cancelled due to A fib with -130s bpm.  Attempt to repeat stress test 17 was also cancelled due to A fib with -155 bpm.      Lab Review   Lab Results   Component Value Date    CREATININE 1.71 (H) 2017    BUN 29 (H) 2017     (L) 2017    K 4.8 2017     2017    CO2 21 (L) 2017     Lab Results   Component Value Date    BNP 76 (H) 2017     Lab Results   Component Value Date    TROPONINI <0.01 2017     Lab Results   Component Value Date    WBC 5.8 2017    HGB 12.6 (L) 2017    HCT 37.4 (L) 2017    MCV 94 2017     2017         Greater than 50 minutes were spent face to face in this visit with more than 50% of the time discussing diagnoses as listed above, counseling, and coordination of care.      Sole Resendez, [facility], Electrophysiology  [phone]    This note has been dictated using voice recognition software. Any grammatical, typographical, or context distortions are unintentional and inherent to the software.

## 2021-06-11 NOTE — PROGRESS NOTES
Yo Mcdonough  [address]  [place] [address]  [phone] (home)     Primary cardiologist:  Dr. Miller  PCP:  Elie Cummings MD  Procedure:  Coronary Angiogram with possible percutaneous intervention, Left heart catheterization with left ventriculogram and Right heart catheterization.  H&P completed by:  Dr. Angela Velazquez MD:  Dr. Miller  Admit date and time:  6/14/17 @ 07:30  Case start time:  10:30  Ordering MD:  Dr. Miller  Diagnosis:  Precordial pain, Dyspnea on exertion  Anticoagulation: Pradaxa  CPAP: Yes  Bypass Grafts: No  Renal Issues: No  Allergies: NKA  Diabetic?: Yes  Device?: No     Angiogram Teaching    Reason for Visit:  Patient seen for pre-procedure education in preparation for:  Coronary Angiogram with possible percutaneous intervention, Left heart catheterization with left ventriculogram and Right heart catheterization.    Procedure Prep:  Cardiologist note dated: 6/6/17  EKG results obtained, dated: on admit  Pertinent test results obtained - Viewable in Epic, dated: 5/24/17 NM Thallium Rest W Spect  6/1/17 Holter  Hemogram results obtained: on admit  Basic Metabolic Panel results obtained: on admit  Lipid Profile results obtained: on admit    Patient Education- Patient refused to hear risks of procedure and intervention  Explained indications for diagnostic evaluation, including one or more of the following:  left heart catheterization, right heart catheterization, coronary angiogram and left ventriculogram  Explained indications/for therapeutic interventions, including one or more of the following: stent.  These risks are in addition to baseline risks associated with a Diagnostic Evaluation.  Patient states understanding of procedure and risks and agrees to proceed.    Pre-procedure instructions  Patient instructed to be NPO after midnight.  Patient instructed to arrange for transportation home following procedure.  Patient instructed to have a responsible adult with  them for 24 hours post-procedure.  Post-procedure follow up process.  Conscious sedation discussed.    Pre-procedure medication instructions  Patient instructed on antiplatelet medication.  Continue medications as scheduled, with a small amount of water on the day of the procedure unless indicated.  Patient instructed to take 325 mg of Aspirin am of procedure: Yes  Other medication: instructed to hold canagliflozin, Pradaxa to be held 6/11 6/12 6/13, metformin, omeprazole a.m. of the procedure.    *PATIENTS RECORDS AVAILABLE IN [facility] UNLESS OTHERWISE INDICATED*    *Order set was entered on this date: 6/7/17      Patient Active Problem List   Diagnosis     Diabetes Mellitus     Dyslipidemia     Alcoholism     Depression     Hypertension     Atrial Fibrillation     Reyes's Esophagus     Chest pain     Dyspnea on exertion       Current Outpatient Prescriptions   Medication Sig Dispense Refill     acetaminophen-codeine (TYLENOL #3) 300-30 mg per tablet Take 1 tablet by mouth every 4 (four) hours as needed for pain.       canagliflozin (INVOKANA) 100 mg Tab Take 100 mg by mouth Daily before breakfast.        CARTIA  mg 24 hr capsule TAKE 1 CAPSULE(120 MG) BY MOUTH DAILY 90 capsule 3     dabigatran etexilate (PRADAXA) 150 mg capsu Take 150 mg by mouth 2 (two) times a day.       lisinopril-hydrochlorothiazide (PRINZIDE,ZESTORETIC) 10-12.5 mg per tablet Take 1 tablet by mouth daily.       metFORMIN (GLUCOPHAGE) 1000 MG tablet Take 1,000 mg by mouth 2 (two) times a day with meals.       metoprolol tartrate (LOPRESSOR) 50 MG tablet TAKE 1 TABLET BY MOUTH TWICE DAILY 180 tablet 0     omeprazole (PRILOSEC) 20 MG capsule Take 20 mg by mouth Daily before breakfast.       simvastatin (ZOCOR) 20 MG tablet Take 10 mg by mouth at bedtime.       traZODone (DESYREL) 150 MG tablet Take 150 mg by mouth at bedtime.       No current facility-administered medications for this visit.        No Known Allergies    Plan  Patient's wife  Catherine to be  day of procedure and will stay with patient after.  Patient ready for procedure     Miya Matta RN

## 2021-06-11 NOTE — PROGRESS NOTES
1950  [phone] (home)  [phone] (mobile)    +++Important patient information for [facility]/Cath Lab staff : None+++    [facility] EP Cath Lab Procedure Order   Cardioversion:  Cardioversion    Diagnosis:  AF  Anticipated Case Duration:  1  Scheduling Needs/Timeframe:  Will need to increase Sotalol 2 days prior to CV, please schedule accordingly    Current Device: None None  Device Company/Device Rep Needed for Procedure: None    Anesthesia:  General-CV Only  Research Protocol:  No    [facility] EP Cath Lab Prep   Ordering Provider: Sole Resendez NP  Ordering Date: 7/7/2017  Orders Status: Intial order placed and Order set placed  EP NC Contact: Zoraida Lorenzana RN    H&P:  Compled by Sole on 7/6/17  PCP: Elie Cummings MD, [phone]    Pre-op Labs: N/A for procedure    Medical Records Pertinent for Procedure:  Holter 6/1/17 EPIC, Echo 6/14/17 EPIC and EKG 6/16/17 EPIC    Patient Education:    Teach with Patient: Completed via phone prior to procedure    Risks Reviewed:     Cardioversion    >90% acute success rate, <10% failure to convert or   reverts shortly after cardioversion.    <1% embolic event of (CVA, pulmonary embolism, or   other site).    75% risk for superficial burn.  Risks associated with general anesthesia will be addressed by the Anesthesiology Department    Consent: Will be obtained in [facility] day of procedure    Pre-Procedure Instructions that were Reviewed with Patient:  NPO after midnight, Notified patient of time and date of procedure by CV , Transportation arrangements needed s/p procedure, Post-procedure follow up process, Sedation plan/orders and Pre-procedure letter was sent to pt by CV     Pre-Procedure Medication Instructions:  Instructions given to pt regarding anticoagulants: Pradaxa- instructed to continue anticoagulation uninterrupted through their procedure  Instructions given to pt regarding antiarrhythmic medication: Sotalol; Pt instructed to increase Sotalol to 80mg  BID 2 days prior to CV, and hold Diltiazem the morning  Instructions for medication, other than anticoagulants/antiarrhythmics listed above, given to pt: to take all morning medications with small sips of water, with the exception of OTC supplements and MVI    No Known Allergies    Current Outpatient Prescriptions:      acetaminophen-codeine (TYLENOL #3) 300-30 mg per tablet, Take 1 tablet by mouth every 4 (four) hours as needed for pain., Disp: , Rfl:      atorvastatin (LIPITOR) 40 MG tablet, TAKE 1 TABLET(40 MG) BY MOUTH AT BEDTIME, Disp: 90 tablet, Rfl: 1     canagliflozin (INVOKANA) 100 mg Tab, Take 100 mg by mouth Daily before breakfast. , Disp: , Rfl:      CARTIA  mg 24 hr capsule, TAKE 1 CAPSULE(120 MG) BY MOUTH DAILY, Disp: 90 capsule, Rfl: 3     dabigatran etexilate (PRADAXA) 150 mg capsu, Take 150 mg by mouth 2 (two) times a day., Disp: , Rfl:      lisinopril-hydrochlorothiazide (PRINZIDE,ZESTORETIC) 10-12.5 mg per tablet, Take 1 tablet by mouth daily., Disp: , Rfl:      metFORMIN (GLUCOPHAGE) 1000 MG tablet, Take 1,000 mg by mouth 2 (two) times a day with meals., Disp: , Rfl:      omeprazole (PRILOSEC) 20 MG capsule, Take 20 mg by mouth Daily before breakfast., Disp: , Rfl:      sotalol (BETAPACE) 80 MG tablet, Take 1 tablet (80 mg total) by mouth 2 (two) times a day., Disp: 60 tablet, Rfl: 6     traZODone (DESYREL) 150 MG tablet, Take 150 mg by mouth at bedtime., Disp: , Rfl:

## 2021-06-11 NOTE — INTERVAL H&P NOTE
H & P update:  Reviewed clinic note of Sole Resendez NP from 7/6/17 and no significant change in status prior to cardioversion.  He has stopped dilt this am and increase sotalol to 80 mg bid 2 days ago.

## 2021-06-11 NOTE — PROGRESS NOTES
Patient seen in clinic for QTc ck after starting Sotalol 40mg BID 6-14-17 per Dr. Miller's order (refer to 6-14-17 phone note) - EKG showed AF w/RVR @ 95, RBB - QTc 444ms - patient stated he does not feel any better after starting Sotalol evening of 6-14-17 - SOB, dizziness, fatigue persists.    Patient stated he thinks he had reaction to something after 6-14-17 - was discharge around 1700 and noted upon return to [facility] that rash developed from waistline to upper thighs bilaterally - area continues to be red, skin sloughing and patient stated continues to itch and burn - patient had not removed dressing from Rt groin - attempted to remove dressing during visit but too painful - dressing remained clean, dry - no signs of bleeding or bruising noted - palpated area around cath insertion site which was soft and non-tender.    Recommended patient sched RAC visit available this am for further evaluation of rhythm and rash - patient agreed.  mg

## 2021-06-11 NOTE — H&P (VIEW-ONLY)
[facility]--Electrophysiology    Assessment/Plan:      Problem List Items Addressed This Visit     Hypertension    Relevant Medications    sotalol (BETAPACE) 80 MG tablet    Atrial Fibrillation - Primary    Relevant Medications    sotalol (BETAPACE) 80 MG tablet    Other Relevant Orders    EP Cardioversion External    Coronary artery disease involving native coronary artery of native heart without angina pectoris    Relevant Medications    sotalol (BETAPACE) 80 MG tablet        1.  Persistent atrial fibrillation: Onset not entirely known, but may have been chronic since 2013.  Ventricular response elevated today.  He had some symptomatic relief when ventricular rates were controlled, but continued to have symptoms of fatigue, dyspnea on exertion and chest discomfort.  We had a lengthy discussion of the physiology and natural progression of atrial fibrillation including treatment options of rate control versus rhythm control depending upon the presence of symptoms.  He will undergo cardioversion for a trial in sinus rhythm for symptom clarification.  We discussed the cardioversion procedure, risks, expected recovery, and follow-up.  Left atrial enlargement on echocardiogram is consistent with a longer history of atrial fibrillation, so sotalol 40 mg twice daily is not likely to be effective.  Increase sotalol to 80 mg twice daily 2 days prior to cardioversion.  He is instructed to not take diltiazem the morning of cardioversion.  Discussed the possible need for overnight observation or a pacemaker, as he has a history of sinus bradycardia, particularly if he has been in atrial fibrillation for years.    He has a [id number] score of 4 for diabetes, hypertension, CAD, and age 65-74.  He is on long-term anticoagulation with Pradaxa 150 mg twice daily.  He has now been taking it directly from the 's packaging and has not missed any doses.  This was restarted on 6/17/2017, so he will be eligible  "for cardioversion after 7/10/2017.    2.  Hypertension: Blood pressure elevated today, so is his heart rate.  Reevaluate at the time of cardioversion.    3.  Coronary artery disease: Mild to moderate nonobstructive CAD seen on cardiac catheterization.  Symptoms are not consistent with degree of coronary disease.    Follow-up with Dr. Miller on 8/11/17.  Follow-up with me in approximately 3 months.    Subjective:      Yo Mcdonough, a very pleasant 66-year-old gentleman, comes in today for follow-up of atrial fibrillation.  He has a history of atrial fibrillation initially diagnosed in 2013 at which time he was determined to be asymptomatic; it appears that he has remained in atrial fibrillation since that time, but it is not entirely clear.  Initial workup included echo and stress test which were both normal.  He also has a history of type 2 diabetes, hypertension, hyperlipidemia, and Reyes's esophagus.  He has had complaints of generalized weakness, fatigue, dyspnea on exertion, and chest tightness for the past 3 months.  Symptoms were progressive, though now he reports that symptoms are less severe.  He was able to chop wood with his wife for 2 hours, but then was \"totally wiped out\".  This is significantly different from his activity tolerance a year ago.  He reports symptoms worsen with activity, but also occur sometimes at rest.  He states that his shortness of breath is better when laying down.  He denies palpitations, abdominal fullness/bloating or peripheral edema, paroxysmal nocturnal dyspnea, orthopnea, lightheadedness, dizziness, pre-syncope, or syncope.  He was started on diltiazem 120 mg daily with improved rate control and lessening of symptoms.  He underwent coronary angiogram that showed mild to moderate CAD.  Echocardiogram showed normal left ventricular systolic performance with an EF of 50-55% and no significant valvular disease, though left atrium was moderately enlarged.  After his " angiogram, he was started on sotalol 40 mg twice daily.  Pradaxa was restarted on 6/17/2017.    Medical, surgical, family, social history, and medications were reviewed and updated as necessary.  After coronary angiogram he developed a bright red rash in his perineum bilateral thighs and around his buttocks, essentially in a boxer brief pattern.  He reports the skin sloughed off and has now returned to normal.    Patient Active Problem List   Diagnosis     Diabetes Mellitus     Dyslipidemia     Alcoholism     Depression     Hypertension     Atrial Fibrillation     Reyes's Esophagus     Precordial pain     Dyspnea on exertion     Coronary artery disease involving native coronary artery of native heart without angina pectoris       Past Medical History:   Diagnosis Date     Atrial fibrillation      Reyes's esophagus      Diabetes mellitus, type II      GERD (gastroesophageal reflux disease)      High cholesterol      Hypertension      Sleep apnea, obstructive     CPAP       Past Surgical History:   Procedure Laterality Date     CARDIAC CATHETERIZATION  06/14/2017    right and left heart cath     CARDIAC CATHETERIZATION N/A 6/14/2017    Procedure: Coronary Angiogram;  Surgeon: Ana Rosa Miller MD;  Location: [facility] Cath Lab;  Service:      CARDIOVERSION       VT ESOPHAGOSCOPY FLEXIBLE TRANSORAL WITH BIOPSY      Description: Esophagoscopy With Biopsy;  Recorded: 07/24/2013;     VT L HRT CATH W/NJX L VENTRICULOGRAPHY IMG S&I N/A 6/14/2017    Procedure: Left Heart Catheterization with Left Ventriculogram;  Surgeon: Ana Rosa Miller MD;  Location: [facility] Cath Lab;  Service: Cardiology     VT RIGHT HEART CATH O2 SATURATION & CARDIAC OUTPUT N/A 6/14/2017    Procedure: Right Heart Catheterization;  Surgeon: Ana Rosa Miller MD;  Location: [facility] Cath Lab;  Service: Cardiology       No Known Allergies    Current Outpatient Prescriptions   Medication Sig Dispense Refill     acetaminophen-codeine (TYLENOL #3)  "300-30 mg per tablet Take 1 tablet by mouth every 4 (four) hours as needed for pain.       atorvastatin (LIPITOR) 40 MG tablet TAKE 1 TABLET(40 MG) BY MOUTH AT BEDTIME 90 tablet 1     canagliflozin (INVOKANA) 100 mg Tab Take 100 mg by mouth Daily before breakfast.        CARTIA  mg 24 hr capsule TAKE 1 CAPSULE(120 MG) BY MOUTH DAILY 90 capsule 3     dabigatran etexilate (PRADAXA) 150 mg capsu Take 150 mg by mouth 2 (two) times a day.       lisinopril-hydrochlorothiazide (PRINZIDE,ZESTORETIC) 10-12.5 mg per tablet Take 1 tablet by mouth daily.       metFORMIN (GLUCOPHAGE) 1000 MG tablet Take 1,000 mg by mouth 2 (two) times a day with meals.       omeprazole (PRILOSEC) 20 MG capsule Take 20 mg by mouth Daily before breakfast.       sotalol (BETAPACE) 80 MG tablet Take 1 tablet (80 mg total) by mouth 2 (two) times a day. 60 tablet 6     traZODone (DESYREL) 150 MG tablet Take 150 mg by mouth at bedtime.       No current facility-administered medications for this visit.        Family History   Problem Relation Age of Onset     Valvular heart disease Father      Pacemaker Father        Social History   Substance Use Topics     Smoking status: Former Smoker     Packs/day: 0.50     Years: 50.00     Quit date: 2004     Smokeless tobacco: Current User     Alcohol use No       Review of Systems:   General: WNL  Eyes: WNL  Ears/Nose/Throat: WNL  Lungs: WNL  Heart: WNL  Stomach: WNL  Bladder: WNL  Muscle/Joints: WNL  Skin: WNL  Nervous System: WNL  Mental Health: WNL     Blood: WNL      Objective:    BP (!) 148/94 (Patient Site: Right Arm, Patient Position: Sitting, Cuff Size: Adult Large)  Pulse (!) 130  Resp 18  Ht 5' 9\" (1.753 m)  Wt 222 lb (100.7 kg)  BMI 32.78 kg/m2    Physical Exam  General Appearance:  Alert, well-appearing, in no acute distress.     HEENT:  Atraumatic, normocephalic; no scleral icterus, EOM intact, PERRL; the mucous membranes were pink and moist.   Chest: Chest symmetric, spine straight.   "   Lungs:   Respirations unlabored; the lungs are clear to auscultation.   Cardiovascular:   Auscultation reveals normal first and second heart sounds with no murmurs, rubs, or gallops.  Irregularly irregular rate and rhythm. No JVD.  Radial and posterior tibial pulses are intact.    Abdomen:  Soft, nontender, nondistended, bowel sounds present.     Extremities: No cyanosis, clubbing, or edema.   Musculoskeletal:  Moves all extremities.     Skin: No xanthelasma.   Neurologic: Mood and affect are appropriate. Oriented to person, place, time, and situation.       Cardiographics  ECG 2017 it was personally reviewed, shows atrial fibrillation with mildly elevated ventricular response at 95 bpm, right bundle branch block, QT interval measures 354 ms.    EC17 was personally reviewed, shows atrial fibrillation with mildly elevated ventricular response at 91 bpm and a right bundle branch block.  This is essentially unchanged from the ECG done on 2017 that showed atrial fibrillation with ventricular rate of 96 bpm and RBBB.    24 hour Holter monitor worn 2017 persistent atrial fibrillation with controlled ventricular response, average ventricular rate was 78 bpm with a range of  bpm.  Average daytime rates between 80 and 100 bpm.  The QRS demonstrated a right bundle branch block with normal QT interval.  No significant pauses or bradycardia.  Rare ventricular ectopy consisting of 2 isolated PVCs.  Symptoms correlating with A. fib with controlled ventricular rates.    Echocardiogram done 2017  1. Normal left ventricular size and systolic performance with a visually estimated ejection fraction of 50-55%.   2. There is mild concentric increase in left ventricular wall thickness.   3. No significant valvular heart disease is identified on this study.   4. There is mild right ventricular enlargement. Right ventricular systolic performance is mildly reduced.   5. There is moderate left atrial  enlargement. There is mild right atrial enlargement.     NM stress test scheduled for 5/23/17 was cancelled due to A fib with -130s bpm.  Attempt to repeat stress test 5/24/17 was also cancelled due to A fib with -155 bpm.    Cardiac catheterization done 6/14/2017    PCWP 17 mmHg, PA 34/20, RV 34/11, RA 14 mmHg. LV EDP 10 mmHg.    CO 3.6 and 4.4 l/min (Félix and TD respectively)    Diffuse coronary artery calcification. Moderate in mid RCA without flow limiting FFR in the distal RCA.    Rate controlled atrial fibrillation thruought the procedure.      Lab Review   Lab Results   Component Value Date    CREATININE 1.37 (H) 06/14/2017    BUN 17 06/14/2017     06/14/2017    K 4.2 06/14/2017     (H) 06/14/2017    CO2 22 06/14/2017     Lab Results   Component Value Date    BNP 76 (H) 05/23/2017     Lab Results   Component Value Date    WBC 5.6 06/14/2017    HGB 11.9 (L) 06/14/2017    HCT 34.9 (L) 06/14/2017    MCV 95 06/14/2017     (L) 06/14/2017         Greater than 35 minutes were spent face to face in this visit with more than 50% of the time discussing diagnoses as listed above, counseling, and coordination of care.      Sole Resendez, [facility], Electrophysiology  [phone]    This note has been dictated using voice recognition software. Any grammatical, typographical, or context distortions are unintentional and inherent to the software.

## 2021-06-11 NOTE — ANESTHESIA POSTPROCEDURE EVALUATION
Patient: Yo Mcdonough  * No procedures listed *  Anesthesia type: general    Patient location: Telemetry/Step Down Unit  Last vitals:   Vitals:    07/13/17 1336   BP: 112/65   Pulse: (!) 59   Resp: 14   Temp:    SpO2: 96%     Post vital signs: stable  Level of consciousness: awake and responds to simple questions  Post-anesthesia pain: pain controlled  Post-anesthesia nausea and vomiting: no  Pulmonary: unassisted, return to baseline  Cardiovascular: stable and blood pressure at baseline  Hydration: adequate  Anesthetic events: no    QCDR Measures:  ASA# 11 - Rachel-op Cardiac Arrest: ASA11B - Patient did NOT experience unanticipated cardiac arrest  ASA# 12 - Rachel-op Mortality Rate: ASA12B - Patient did NOT die  ASA# 13 - PACU Re-Intubation Rate: ASA13B - Patient did NOT require a new airway mgmt  ASA# 10 - Composite Anes Safety: ASA10A - No serious adverse event  ASA# 38 - New Corneal Injury: ASA38A - No new exposure keratitis or corneal abrasion in PACU    Additional Notes:

## 2021-06-11 NOTE — PROGRESS NOTES
Thank you for asking the [facility] team to see Yo Mcdonough      Assessment/Plan:     Atrial fibrillation with RVR of unclear duration. The concern is that he may have a tachycardia mediated cardiomyopathy. Yo does coronary calcification and his symptoms seem out of proportion to the heart rates reported on the recent holter monitor.     I am setting him up for a right and left heart catheterizations with possible intervention. We will get his echo that same day in the [facility]. He will need his pradaxa held for 3 days prior. Yo will go to the closest ER if his symptoms progress in the interim (he lives in [place]).        Current History:   Yo Mcdonough is a 66 y.o. with persistent atrial fibrillation who I last saw in 2013. He takes pradaxa for stroke prophylaxis. In March, while in [place], after being treated with 4 rounds of antibiotics and steroids for a bad bronchitis, he was noting dyspnea on limited exertion. He came back and saw Dr. Cummings who did an EKG which showed atrial fibrillation with RVR and no ischemia. Yo was set up for a stress test, but as he was in RVR it was not performed. His metoprolol was titrated and he came back the next day but his HR was still too fast for a stress test. His resting images were normal. He saw our EP nurse practitioner May 31st and she started diltiazem 120mg day. A holter monitor the next day showed rate controlled atrial fibrillation. An echo is still pending. Since then he has noted that he is able to shower and towel himself off with less dyspnea, but still has significant dyspnea with exertion. He also notes shoulder discomfort that radiates to his back and leg fatigue/weakness with limited exertion. He denies PND/orthopnea but did have significant ankle edema in [place]. BNP was 76 here on May 23rd. Creat was elevated at 1.7.  LDL was 72.     Past Medical History:     Past Medical History:   Diagnosis Date     Atrial  fibrillation      Reyes's esophagus      Diabetes mellitus, type II      GERD (gastroesophageal reflux disease)      High cholesterol      Hypertension      Sleep apnea, obstructive     CPAP       Past Surgical History:     Past Surgical History:   Procedure Laterality Date     CARDIOVERSION       ND ESOPHAGOSCOPY FLEXIBLE TRANSORAL WITH BIOPSY      Description: Esophagoscopy With Biopsy;  Recorded: 07/24/2013;       Family History:     Family History   Problem Relation Age of Onset     Valvular heart disease Father      Pacemaker Father        Social History:    reports that he quit smoking about 17 years ago. He has a 25.00 pack-year smoking history. He does not have any smokeless tobacco history on file. He reports that he drinks about 7.2 oz of alcohol per week     Meds:     Current Outpatient Prescriptions   Medication Sig     canagliflozin (INVOKANA) 100 mg Tab Take 100 mg by mouth Daily before breakfast.      CARTIA  mg 24 hr capsule TAKE 1 CAPSULE(120 MG) BY MOUTH DAILY     dabigatran etexilate (PRADAXA) 150 mg capsu Take 150 mg by mouth 2 (two) times a day.     lisinopril-hydrochlorothiazide (PRINZIDE,ZESTORETIC) 10-12.5 mg per tablet Take 1 tablet by mouth daily.     metFORMIN (GLUCOPHAGE) 1000 MG tablet Take 1,000 mg by mouth 2 (two) times a day with meals.     metoprolol tartrate (LOPRESSOR) 50 MG tablet TAKE 1 TABLET BY MOUTH TWICE DAILY     omeprazole (PRILOSEC) 20 MG capsule Take 20 mg by mouth Daily before breakfast.     simvastatin (ZOCOR) 20 MG tablet Take 10 mg by mouth at bedtime.     traZODone (DESYREL) 150 MG tablet Take 150 mg by mouth at bedtime.     acetaminophen-codeine (TYLENOL #3) 300-30 mg per tablet Take 1 tablet by mouth every 4 (four) hours as needed for pain.       Allergies:   Review of patient's allergies indicates no known allergies.    Review of Systems:   Review of Systems:   General: WNL  Eyes: WNL  Ears/Nose/Throat: WNL  Lungs: Shortness of Breath  Heart: Shortness of  "Breath with activity, Irregular Heartbeat  Stomach: WNL  Bladder: WNL  Muscle/Joints: Muscle Weakness  Skin: WNL  Nervous System: WNL  Mental Health: WNL     Blood: WNL       Objective:      Physical Exam  @LASTENCWT:3@  5' 9\" (1.753 m)  @BMI:3@  BP 92/58 (Patient Site: Right Arm, Patient Position: Sitting, Cuff Size: Adult Large)  Pulse 68  Resp 16  Ht 5' 9\" (1.753 m)  Wt 218 lb (98.9 kg)  BMI 32.19 kg/m2    General Appearance:   Alert, cooperative and in no acute distress.   HEENT:  No scleral icterus; the mucous membranes were pink and moist.   Neck: JVP flat. No thyromegaly. No HJR   Chest: The spine was straight. The chest was symmetric.   Lungs:   Respirations unlabored; the lungs are clear to auscultation after a cough.   Cardiovascular:   S1 and S2 irregular and without murmur. No clicks or rubs. No carotid bruits noted. Right DP, PT, and radial pulses 2+. Left DP, PT, and radial pulses 2+.   Abdomen:  No organomegaly, masses, bruits, or tenderness. Bowels sounds are present   Extremities: No cyanosis, clubbing. Minimal ankle edema.   Skin: No xanthelasma.   Neurologic: Mood and affect are appropriate.         Lab Review   Lab Results   Component Value Date     (L) 05/23/2017     05/16/2017     11/01/2016    K 4.8 05/23/2017    K 5.3 (H) 05/16/2017    K 4.3 11/01/2016     05/23/2017     05/16/2017     11/01/2016    CO2 21 (L) 05/23/2017    CO2 19 (L) 05/16/2017    CO2 23 11/01/2016    BUN 29 (H) 05/23/2017    BUN 21 05/16/2017    BUN 13 11/01/2016    CREATININE 1.71 (H) 05/23/2017    CREATININE 1.41 (H) 05/16/2017    CREATININE 1.13 11/01/2016    CALCIUM 9.6 05/23/2017    CALCIUM 9.5 05/16/2017    CALCIUM 9.1 11/01/2016     Lab Results   Component Value Date    WBC 5.8 05/23/2017    HGB 12.6 (L) 05/23/2017    HCT 37.4 (L) 05/23/2017    MCV 94 05/23/2017     05/23/2017     Lab Results   Component Value Date    CHOL 129 05/16/2017    CHOL 134 05/19/2016    CHOL " 164 05/19/2015    TRIG 127 05/16/2017    TRIG 93 05/19/2016    TRIG 200 (H) 05/19/2015    HDL 32 (L) 05/16/2017    HDL 34 (L) 05/19/2016    HDL 35 (L) 05/19/2015     Lab Results   Component Value Date    BNP 76 (H) 05/23/2017         Ana Rosa Miller M.D.

## 2021-06-11 NOTE — PROGRESS NOTES
Asked to see patient today urgently because of erythema and itching after coronary angiogram.  The patient had a coronary angiogram on Wednesday of this week to evaluate shortness of breath, high risk for coronary atherosclerosis, and atrial fibrillation.  The angiogram went without issues and he does have mild to moderate coronary artery disease with some mild elevation in his coronary capillary wedge pressure.  He was started on sotalol therapy by Dr. lisa gracia after the angiogram.  He came in today for a 12-lead ECG which does not show prolongation of his QT interval but continued atrial fibrillation with a ventricular response of 95 bpm and a right bundle branch block.    He has dramatic itching and erythema.  On physical exam he has erythema in his upper thighs bilaterally, lower legs to a lesser extent and some on his chest.  There is not on his back but there is on his buttocks.  Also observed are excoriations from scratching.  There is no warmth to the erythema and is slightly raised.  His right groin site is without wheezing and I took the bandage off and the site by exam is normal.    He had this erythema and rash and itching prior to taking his first dose of sotalol so I suspect is from the contrast.  We will give him a short burst of prednisone and if he is not better by Monday he will see his primary care physician.  If he is worse he will call us for further instructions.    I have also asked him to take Benadryl particularly at night to improve the itching which she will obtain over-the-counter.    Thanks,    Joe Daniel

## 2021-06-12 NOTE — PROGRESS NOTES
Thank you for asking the [facility] team to see Yo Mcdonough      Assessment/Plan:     Atrial fibrillation - now s/p cardioversion and is on sotalol and pradaxa.  Encouraged alcohol cessation.    Non-obstructive coronary disease - on atorvastatin. LDL was 45 in June. Encouraged exercise and weight loss.     F/U 1 year       Current History:   Yo Mcdonough is a 66 y.o. with persistent atrial fibrillation who I last saw in 2013. He takes pradaxa for stroke prophylaxis. In March, while in [place], after being treated with 4 rounds of antibiotics and steroids for a bad bronchitis, he was noting dyspnea on limited exertion. He came back and saw Dr. Cummings who did an EKG which showed atrial fibrillation with RVR and no ischemia. Yo was set up for a stress test, but as he was in RVR it was not performed.EF by echo was 50-55%. Right and left heart catheterizations showed normal filling pressures with moderate RCA disease without flow limiting FFR. He underwent cardioversion on sotalol in July.    He returns for annual f/u and thinks he has been in NSR since his cardioversion. He denies PND/orthopnea/chest pain. He did have an episode of lightheadeness and SBP in the 80s, but his SBPs are mostly 120-140s. He can give no reason for the low BP that one day.  He wants to get back to exercising to lose some weight.    Past Medical History:     Past Medical History:   Diagnosis Date     Atrial fibrillation      Reyes's esophagus      Diabetes mellitus, type II      GERD (gastroesophageal reflux disease)      High cholesterol      Hypertension      Sleep apnea, obstructive     CPAP       Past Surgical History:     Past Surgical History:   Procedure Laterality Date     CARDIAC CATHETERIZATION  06/14/2017    right and left heart cath     CARDIAC CATHETERIZATION N/A 6/14/2017    Procedure: Coronary Angiogram;  Surgeon: Ana Rosa Miller MD;  Location: [facility];  Service:       CARDIOVERSION       CARDIOVERSION  07/13/2017     FL ESOPHAGOSCOPY FLEXIBLE TRANSORAL WITH BIOPSY      Description: Esophagoscopy With Biopsy;  Recorded: 07/24/2013;     FL L HRT CATH W/NJX L VENTRICULOGRAPHY IMG S&I N/A 6/14/2017    Procedure: Left Heart Catheterization with Left Ventriculogram;  Surgeon: Ana Rosa Miller MD;  Location: [facility];  Service: Cardiology     FL RIGHT HEART CATH O2 SATURATION & CARDIAC OUTPUT N/A 6/14/2017    Procedure: Right Heart Catheterization;  Surgeon: Ana Rosa Miller MD;  Location: [facility] Lab;  Service: Cardiology       Family History:     Family History   Problem Relation Age of Onset     Valvular heart disease Father      Pacemaker Father        Social History:    reports that he quit smoking about 13 years ago. He has a 25.00 pack-year smoking history. He uses smokeless tobacco. He reports that he does not drink alcohol or use illicit drugs.    Meds:     Current Outpatient Prescriptions   Medication Sig     atorvastatin (LIPITOR) 40 MG tablet TAKE 1 TABLET(40 MG) BY MOUTH AT BEDTIME     canagliflozin (INVOKANA) 100 mg Tab Take 100 mg by mouth Daily before breakfast.      dabigatran etexilate (PRADAXA) 150 mg capsu Take 150 mg by mouth 2 (two) times a day.     lisinopril-hydrochlorothiazide (PRINZIDE,ZESTORETIC) 10-12.5 mg per tablet Take 1 tablet by mouth daily.     metFORMIN (GLUCOPHAGE) 1000 MG tablet Take 1,000 mg by mouth 2 (two) times a day with meals.     omeprazole (PRILOSEC) 20 MG capsule Take 20 mg by mouth Daily before breakfast.     sotalol (BETAPACE) 80 MG tablet Take 1 tablet (80 mg total) by mouth 2 (two) times a day.     traZODone (DESYREL) 150 MG tablet Take 150 mg by mouth at bedtime.       Allergies:   Betadine [povidone-iodine] and Dye    Review of Systems:   Review of Systems:   General: WNL  Eyes: WNL  Ears/Nose/Throat: WNL  Lungs: WNL  Heart: WNL  Stomach: WNL  Bladder: WNL  Muscle/Joints: WNL  Skin: WNL  Nervous System: WNL  Mental  "Health: WNL     Blood: WNL       Objective:      Physical Exam  @LASTENCWT:3@  5' 9\" (1.753 m)  @BMI:3@  /70 (Patient Site: Left Arm, Patient Position: Sitting, Cuff Size: Adult Large)  Pulse 64  Resp 18  Ht 5' 9\" (1.753 m)  Wt (!) 227 lb (103 kg)  BMI 33.52 kg/m2    General Appearance:   Alert, cooperative and in no acute distress.   HEENT:  No scleral icterus; the mucous membranes were pink and moist.   Neck: JVP flat. No thyromegaly. No HJR   Chest: The spine was straight. The chest was symmetric.   Lungs:   Respirations unlabored; the lungs are clear to auscultation.   Cardiovascular:   S1 and S2 normal and without murmur. No clicks or rubs. No carotid bruits noted. Right DP, PT, and radial pulses 2+. Left DP, PT, and radial pulses 2+.   Abdomen:  No organomegaly, masses, bruits, or tenderness. Bowels sounds are present   Extremities: No cyanosis, clubbing, or edema.   Skin: No xanthelasma.   Neurologic: Mood and affect are appropriate.         Lab Review   Lab Results   Component Value Date     06/14/2017     (L) 05/23/2017     05/16/2017    K 4.8 07/13/2017    K 4.2 06/14/2017    K 4.8 05/23/2017     (H) 06/14/2017     05/23/2017     05/16/2017    CO2 22 06/14/2017    CO2 21 (L) 05/23/2017    CO2 19 (L) 05/16/2017    BUN 17 06/14/2017    BUN 29 (H) 05/23/2017    BUN 21 05/16/2017    CREATININE 1.37 (H) 06/14/2017    CREATININE 1.71 (H) 05/23/2017    CREATININE 1.41 (H) 05/16/2017    CALCIUM 8.5 06/14/2017    CALCIUM 9.6 05/23/2017    CALCIUM 9.5 05/16/2017     Lab Results   Component Value Date    WBC 5.6 06/14/2017    WBC 5.8 05/23/2017    HGB 11.9 (L) 06/14/2017    HGB 12.6 (L) 05/23/2017    HCT 34.9 (L) 06/14/2017    HCT 37.4 (L) 05/23/2017    MCV 95 06/14/2017    MCV 94 05/23/2017     (L) 06/14/2017     05/23/2017     Lab Results   Component Value Date    CHOL 106 06/14/2017    CHOL 129 05/16/2017    CHOL 134 05/19/2016    TRIG 190 (H) 06/14/2017 "    TRIG 127 05/16/2017    TRIG 93 05/19/2016    HDL 23 (L) 06/14/2017    HDL 32 (L) 05/16/2017    HDL 34 (L) 05/19/2016     Lab Results   Component Value Date    BNP 76 (H) 05/23/2017         Ana Rosa Miller M.D.

## 2021-06-13 NOTE — PROGRESS NOTES
[facility]--Electrophysiology    Assessment/Plan:      Problem List Items Addressed This Visit     Hypertension    Relevant Medications    sotalol (BETAPACE) 120 MG tablet    Atrial Fibrillation - Primary    Relevant Medications    sotalol (BETAPACE) 120 MG tablet    Other Relevant Orders    ECG 12 lead with MUSE (Completed)    Coronary artery disease involving native coronary artery of native heart without angina pectoris    Relevant Medications    sotalol (BETAPACE) 120 MG tablet    Obstructive sleep apnea on CPAP        1.  Persistent atrial fibrillation:  No evidence of recurrence since cardioversion in July.  He has had some symptomatic improvement with maintenance of sinus rhythm, confirming symptoms of palpitations and dyspnea on exertion.  He continues to have significant symptoms of decreased activity tolerance due to weakness and fatigue.  This may be in part due to sinus bradycardia.  Decrease sotalol to 60 mg twice daily.  We had a lengthy discussion of the physiology and natural progression of atrial fibrillation including treatment options of antiarrhythmic medications versus pulmonary vein isolation ablation.  We discussed antiarrhythmic medication options including dronedarone, dofetilide, and amiodarone.  We also discussed PVI utilizing radiofrequency or cryo, <1% risk for complication, expected recovery, and follow-up.  He is potentially interested in pursuing ablative therapies.    He has a [id number] score of 4 for diabetes, hypertension, CAD, and age 65-74.  He is on long-term anticoagulation with Pradaxa 150 mg twice daily.  He reports taking it directly from the 's packaging and has not missed any doses.      2.  Hypertension: Blood pressure on the lower end of target today and even lower on home readings.  This is likely contributing to symptoms.  He appears somewhat dehydrated and was encouraged to increase his daily fluid intake.  Reevaluate with decrease in sotalol  "dose.    3.  Coronary artery disease: Mild to moderate nonobstructive CAD seen on cardiac catheterization.  Symptoms felt not consistent with degree of coronary disease.    4.  Obstructive sleep apnea: Consistent use of CPAP nightly.    Follow-up with Dr. Ramirez or Dr. Oro to further discuss treatment options of antiarrhythmic medications versus ablation.    Subjective:      Yo Mcdonough, a very pleasant 67-year-old gentleman, comes in today for follow-up of atrial fibrillation.  He has a history of atrial fibrillation initially diagnosed in 2013 at which time he was determined to be asymptomatic; it not clear whether he has remained in atrial fibrillation since that time, or had recent recurrence.  Initial workup included echo and stress test which were both normal.  He also has a history of type 2 diabetes, hypertension, hyperlipidemia, Reyes's esophagus, obstructive sleep apnea for which he wears CPAP nightly.  He had complaints of generalized weakness, fatigue, dyspnea on exertion, and chest tightness for approximately 3 months prior to documentation of atrial fibrillation.  Symptoms were progressive, though became less severe with ventricular rate control.  He was able to chop wood with his wife for 2 hours, but then was \"totally wiped out\".  This is significantly different from his activity tolerance a year ago.  He underwent coronary angiogram that showed mild to moderate CAD.  Echocardiogram showed normal left ventricular systolic performance with an EF of 50-55% and no significant valvular disease, though left atrium was moderately enlarged.  After his angiogram, he was started on sotalol 40 mg twice daily subsequently increased to 80 mg twice daily.  Pradaxa was restarted on 6/17/2017 and he underwent cardioversion on 7/13/2017.    Rudolph reports some symptomatic improvement following cardioversion; he no longer has a pounding heartbeat and denies shortness of breath.  He still has a " significant decrease in activity tolerance due to weakness and fatigue.  He denies exertional chest pain, palpitations, abdominal fullness/bloating or peripheral edema, shortness of breath, paroxysmal nocturnal dyspnea, orthopnea, lightheadedness, dizziness, pre-syncope, or syncope.  He states that his systolic blood pressures have been 90s-100s with heart rate 50s-60.  He has not been drinking any caffeine or alcohol, but has also not been drinking much in terms of daily fluids.     Medical, surgical, family, social history, and medications were reviewed and updated as necessary.  After coronary angiogram he developed a bright red rash in his perineum bilateral thighs and around his buttocks, essentially in a boxer brief pattern.  He reports the skin sloughed off then returned to normal.    Patient Active Problem List   Diagnosis     Diabetes Mellitus     Dyslipidemia     Alcoholism     Depression     Hypertension     Atrial Fibrillation     Reyes's Esophagus     Precordial pain     Coronary artery disease involving native coronary artery of native heart without angina pectoris     Obstructive sleep apnea on CPAP       Past Medical History:   Diagnosis Date     Atrial fibrillation      Reyes's esophagus      Diabetes mellitus, type II      GERD (gastroesophageal reflux disease)      High cholesterol      Hypertension      Sleep apnea, obstructive     CPAP       Past Surgical History:   Procedure Laterality Date     CARDIAC CATHETERIZATION  06/14/2017    right and left heart cath     CARDIAC CATHETERIZATION N/A 6/14/2017    Procedure: Coronary Angiogram;  Surgeon: Ana Rosa Miller MD;  Location: [facility] Cath Lab;  Service:      CARDIOVERSION       CARDIOVERSION  07/13/2017     [place] ESOPHAGOSCOPY FLEXIBLE TRANSORAL WITH BIOPSY      Description: Esophagoscopy With Biopsy;  Recorded: 07/24/2013;     [place] L HRT CATH W/NJX L VENTRICULOGRAPHY IMG S&I N/A 6/14/2017    Procedure: Left Heart Catheterization with Left  Ventriculogram;  Surgeon: Ana Rosa Miller MD;  Location: [facility] Cath Lab;  Service: Cardiology     ND RIGHT HEART CATH O2 SATURATION & CARDIAC OUTPUT N/A 6/14/2017    Procedure: Right Heart Catheterization;  Surgeon: Ana Rosa Miller MD;  Location: [facility] Cath Lab;  Service: Cardiology       Allergies   Allergen Reactions     Betadine [Povidone-Iodine] Rash     Solution used preop for angiogram to scrub.  Skin became red and pealed off afterward.  Not sure if it was the scrub or the dye.     Dye Rash     Contrast dye       Current Outpatient Prescriptions   Medication Sig Dispense Refill     atorvastatin (LIPITOR) 40 MG tablet Take 1 tablet (40 mg total) by mouth at bedtime. 90 tablet 1     canagliflozin (INVOKANA) 100 mg Tab Take 100 mg by mouth Daily before breakfast.        dabigatran etexilate (PRADAXA) 150 mg capsu Take 150 mg by mouth 2 (two) times a day.       lisinopril-hydrochlorothiazide (PRINZIDE,ZESTORETIC) 10-12.5 mg per tablet Take 1 tablet by mouth daily.       metFORMIN (GLUCOPHAGE) 1000 MG tablet Take 1,000 mg by mouth 2 (two) times a day with meals.       omeprazole (PRILOSEC) 20 MG capsule Take 20 mg by mouth Daily before breakfast.       sotalol (BETAPACE) 120 MG tablet Take 0.5 tablets (60 mg total) by mouth 2 (two) times a day. 30 tablet 6     traZODone (DESYREL) 150 MG tablet Take 150 mg by mouth at bedtime.       No current facility-administered medications for this visit.        Family History   Problem Relation Age of Onset     Valvular heart disease Father      Pacemaker Father        Social History   Substance Use Topics     Smoking status: Former Smoker     Packs/day: 0.50     Years: 50.00     Quit date: 2004     Smokeless tobacco: Current User     Alcohol use No       Review of Systems:   General: WNL  Eyes: WNL  Ears/Nose/Throat: WNL  Lungs: WNL  Heart: WNL  Stomach: WNL  Bladder: WNL  Muscle/Joints: WNL  Skin: WNL  Nervous System: WNL  Mental Health: WNL     Blood:  "WNL      Objective:    /66 (Patient Site: Right Arm, Patient Position: Sitting, Cuff Size: Adult Large)  Pulse 60  Resp 18  Ht 5' 9\" (1.753 m)  Wt (!) 226 lb (102.5 kg)  BMI 33.37 kg/m2    Physical Exam  General Appearance:  Alert, well-appearing, in no acute distress.     HEENT:  Atraumatic, normocephalic; no scleral icterus, EOM intact, PERRL; the mucous membranes were pink and dry.   Chest: Chest symmetric, spine straight.     Lungs:   Respirations unlabored; the lungs are clear to auscultation.   Cardiovascular:   Auscultation reveals normal first and second heart sounds with no murmurs, rubs, or gallops.  Regular rate and rhythm. No JVD.  Radial and posterior tibial pulses are intact.    Abdomen:  Soft, nontender, nondistended, bowel sounds present.     Extremities: No cyanosis, clubbing, or edema.   Musculoskeletal:  Moves all extremities.     Skin: No xanthelasma.   Neurologic: Mood and affect are appropriate. Oriented to person, place, time, and situation.       Cardiographics (personally reviewed)  ECG done 10/19/2017 shows sinus bradycardia 50 bpm with a right bundle branch block, QT/QTc interval measures 534/486 ms consistent with manual measurement  ECG done post cardioversion on 2017 showed sinus bradycardia at 66 bpm with a first-degree AV block, right bundle branch block, left anterior fascicular block, QT interval manually measures 440 ms though computer measured QT/QTc interval 466/488 ms  ECG 2017 shows atrial fibrillation with mildly elevated ventricular response at 95 bpm, right bundle branch block, QT interval measures 354 ms.    EC17 shows atrial fibrillation with mildly elevated ventricular response at 91 bpm and a right bundle branch block.  This is essentially unchanged from the ECG done on 2017 that showed atrial fibrillation with ventricular rate of 96 bpm and RBBB.    24 hour Holter monitor worn 2017 persistent atrial fibrillation with controlled " ventricular response, average ventricular rate was 78 bpm with a range of  bpm.  Average daytime rates between 80 and 100 bpm.  The QRS demonstrated a right bundle branch block with normal QT interval.  No significant pauses or bradycardia.  Rare ventricular ectopy consisting of 2 isolated PVCs.  Symptoms correlating with A. fib with controlled ventricular rates.    Echocardiogram done 6/14/2017  1. Normal left ventricular size and systolic performance with a visually estimated ejection fraction of 50-55%.   2. There is mild concentric increase in left ventricular wall thickness.   3. No significant valvular heart disease is identified on this study.   4. There is mild right ventricular enlargement. Right ventricular systolic performance is mildly reduced.   5. There is moderate left atrial enlargement. There is mild right atrial enlargement.     NM stress test scheduled for 5/23/17 was cancelled due to A fib with -130s bpm.  Attempt to repeat stress test 5/24/17 was also cancelled due to A fib with -155 bpm.    Cardiac catheterization done 6/14/2017    PCWP 17 mmHg, PA 34/20, RV 34/11, RA 14 mmHg. LV EDP 10 mmHg.    CO 3.6 and 4.4 l/min (Félix and TD respectively)    Diffuse coronary artery calcification. Moderate in mid RCA without flow limiting FFR in the distal RCA.    Rate controlled atrial fibrillation thruought the procedure.      Lab Review   Lab Results   Component Value Date    CREATININE 1.37 (H) 06/14/2017    BUN 17 06/14/2017     06/14/2017    K 4.8 07/13/2017     (H) 06/14/2017    CO2 22 06/14/2017     Lab Results   Component Value Date    BNP 76 (H) 05/23/2017     Lab Results   Component Value Date    WBC 5.6 06/14/2017    HGB 11.9 (L) 06/14/2017    HCT 34.9 (L) 06/14/2017    MCV 95 06/14/2017     (L) 06/14/2017         Greater than 40 minutes were spent face to face in this visit with more than 50% of the time discussing diagnoses as listed above, counseling, and  coordination of care.      Sole Resendez, [facility], Electrophysiology  [phone]    This note has been dictated using voice recognition software. Any grammatical, typographical, or context distortions are unintentional and inherent to the software.     Current every day smoker

## 2021-06-15 PROBLEM — R07.2 PRECORDIAL PAIN: Status: ACTIVE | Noted: 2017-05-31

## 2021-06-16 PROBLEM — G47.33 OBSTRUCTIVE SLEEP APNEA ON CPAP: Status: ACTIVE | Noted: 2017-10-19

## 2021-06-16 PROBLEM — I25.10 CORONARY ARTERY DISEASE INVOLVING NATIVE CORONARY ARTERY OF NATIVE HEART WITHOUT ANGINA PECTORIS: Status: ACTIVE | Noted: 2017-07-06

## 2021-06-20 NOTE — PROGRESS NOTES
Thank you for asking the [facility] team to see Yo Mcdonough .      Assessment/Plan:   Atrial fibrillation - no known recurrences since his ablation. On pradaxa. Will plan a 2 week monitor next May and if negative for occult afib will discuss discontinuing the pradaxa. He will check his heart rhythm for 1 minute daily at home.    Non-obstructive CAD - asymptomatic. LDL 55 in June. Exercise and diet discussed    Alcoholism - in remission currently. He has good community support    F/U 1 year     Current History:   Yo Mcdonough is a 67 y.o. with persistent atrial fibrillation who I last saw in 2013. He takes pradaxa for stroke prophylaxis. In March, while in [place], after being treated with 4 rounds of antibiotics and steroids for a bad bronchitis, he was noting dyspnea on limited exertion. He came back and saw Dr. Cummings who did an EKG which showed atrial fibrillation with RVR and no ischemia. Yo was set up for a stress test, but as he was in RVR it was not performed.EF by echo was 50-55%. Right and left heart catheterizations showed normal filling pressures with moderate RCA disease without flow limiting FFR. He underwent cardioversion on sotalol in July. In March he had an afib PVI with wide circumferential antral ablation in [place] with documented exit and entrance block in all 4 pulmonary veins.     He returns for annual f/u. Since his ablation he was able to stop sotalol but has continued pradaxa. He had two hospitalizations related to liver failure from drinking in January and has been abstinent since. He denies chest pain, dyspnea, fatigue, palpitations or dizziness.     Past Medical History:     Past Medical History:   Diagnosis Date     Atrial fibrillation (H)      Reyes's esophagus      Diabetes mellitus, type II (H)      GERD (gastroesophageal reflux disease)      High cholesterol      Hypertension      Sleep apnea, obstructive     CPAP       Past Surgical History:      Past Surgical History:   Procedure Laterality Date     CARDIAC CATHETERIZATION  06/14/2017    right and left heart cath     CARDIAC CATHETERIZATION N/A 6/14/2017    Procedure: Coronary Angiogram;  Surgeon: Ana Rosa Miller MD;  Location: [facility] Cath Lab;  Service:      CARDIOVERSION       CARDIOVERSION  07/13/2017     [place] ESOPHAGOSCOPY FLEXIBLE TRANSORAL WITH BIOPSY      Description: Esophagoscopy With Biopsy;  Recorded: 07/24/2013;     AR L HRT CATH W/NJX L VENTRICULOGRAPHY IMG S&I N/A 6/14/2017    Procedure: Left Heart Catheterization with Left Ventriculogram;  Surgeon: Ana Rosa Mliler MD;  Location: [facility] Cath Lab;  Service: Cardiology     AR RIGHT HEART CATH O2 SATURATION & CARDIAC OUTPUT N/A 6/14/2017    Procedure: Right Heart Catheterization;  Surgeon: Ana Rosa Miller MD;  Location: [facility] Cath Lab;  Service: Cardiology       Family History:     Family History   Problem Relation Age of Onset     Valvular heart disease Father      Pacemaker Father        Social History:    reports that he quit smoking about 14 years ago. He has a 25.00 pack-year smoking history. He uses smokeless tobacco. He reports that he does not drink alcohol or use illicit drugs.    Meds:     Current Outpatient Prescriptions   Medication Sig     atorvastatin (LIPITOR) 40 MG tablet Take 1 tablet (40 mg total) by mouth at bedtime.     canagliflozin (INVOKANA) 100 mg Tab Take 100 mg by mouth Daily before breakfast.      cyanocobalamin, vitamin B-12, 1,000 mcg cap Take 1,000 mcg by mouth daily.     dabigatran etexilate (PRADAXA) 150 mg capsu Take 150 mg by mouth 2 (two) times a day.     gabapentin (NEURONTIN) 300 MG capsule Take 300 mg by mouth 3 (three) times a day.     glimepiride (AMARYL) 4 MG tablet Take 1 tablet by mouth daily.     lisinopril-hydrochlorothiazide (PRINZIDE,ZESTORETIC) 10-12.5 mg per tablet Take 1 tablet by mouth daily.     metFORMIN (GLUCOPHAGE) 500 MG tablet Take 500 mg by mouth 2 (two) times a day.      "omeprazole (PRILOSEC) 20 MG capsule Take 20 mg by mouth Daily before breakfast.     traZODone (DESYREL) 150 MG tablet Take 150 mg by mouth at bedtime.     atorvastatin (LIPITOR) 40 MG tablet TAKE 1 TABLET(40 MG) BY MOUTH AT BEDTIME     metFORMIN (GLUCOPHAGE) 1000 MG tablet Take 1,000 mg by mouth 2 (two) times a day with meals.     sotalol (BETAPACE) 120 MG tablet TAKE ONE-HALF TABLET BY MOUTH TWICE DAILY       Allergies:   Betadine [povidone-iodine] and Dye    Review of Systems:   Review of Systems:   General: WNL  Eyes: WNL  Ears/Nose/Throat: WNL  Lungs: Shortness of Breath  Heart: Irregular Heartbeat, Leg Swelling  Stomach: WNL  Bladder: WNL  Muscle/Joints: WNL  Skin: WNL  Nervous System: WNL  Mental Health: WNL     Blood: WNL       Objective:      Physical Exam  @LASTENCWT:3@  5' 9\" (1.753 m)  @BMI:3@  /68 (Patient Site: Right Arm, Patient Position: Sitting, Cuff Size: Adult Large)  Pulse 64  Resp 16  Ht 5' 9\" (1.753 m)  Wt (!) 228 lb (103.4 kg)  BMI 33.67 kg/m2    General Appearance:   Alert, cooperative and in no acute distress.   HEENT:  No scleral icterus; the mucous membranes were pink and moist.   Neck: JVP flat. No thyromegaly. No HJR   Chest: The spine was straight. The chest was symmetric.   Lungs:   Respirations unlabored; the lungs are clear to auscultation.   Cardiovascular:   S1 and S2 normal and without murmur. No clicks or rubs. No carotid bruits noted. Right DP, PT, and radial pulses 2+. Left DP, PT, and radial pulses 2+.   Abdomen:  No organomegaly, masses, bruits, or tenderness. Bowels sounds are present   Extremities: No cyanosis, clubbing, or edema.   Skin: No xanthelasma.   Neurologic: Mood and affect are appropriate.         Lab Review   Lab Results   Component Value Date     10/19/2017     06/14/2017     (L) 05/23/2017    K 5.5 (H) 10/19/2017    K 4.8 07/13/2017    K 4.2 06/14/2017     10/19/2017     (H) 06/14/2017     05/23/2017    CO2 16 (L) " 10/19/2017    CO2 22 06/14/2017    CO2 21 (L) 05/23/2017    BUN 27 (H) 10/19/2017    BUN 17 06/14/2017    BUN 29 (H) 05/23/2017    CREATININE 2.44 (H) 10/19/2017    CREATININE 1.37 (H) 06/14/2017    CREATININE 1.71 (H) 05/23/2017    CALCIUM 9.4 10/19/2017    CALCIUM 8.5 06/14/2017    CALCIUM 9.6 05/23/2017     Lab Results   Component Value Date    WBC 5.6 06/14/2017    WBC 5.8 05/23/2017    HGB 11.9 (L) 06/14/2017    HGB 12.6 (L) 05/23/2017    HCT 34.9 (L) 06/14/2017    HCT 37.4 (L) 05/23/2017    MCV 95 06/14/2017    MCV 94 05/23/2017     (L) 06/14/2017     05/23/2017     Lab Results   Component Value Date    CHOL 106 06/14/2017    CHOL 129 05/16/2017    CHOL 134 05/19/2016    TRIG 190 (H) 06/14/2017    TRIG 127 05/16/2017    TRIG 93 05/19/2016    HDL 23 (L) 06/14/2017    HDL 32 (L) 05/16/2017    HDL 34 (L) 05/19/2016     Lab Results   Component Value Date    BNP 76 (H) 05/23/2017         Ana Rosa Miller M.D.

## 2021-06-25 NOTE — PROGRESS NOTES
Progress Notes by Alie Oro MD at 11/10/2017  7:50 AM     Author: Alie Oro MD Service: -- Author Type: Physician    Filed: 11/10/2017  9:06 AM Encounter Date: 11/10/2017 Status: Signed    : Alie Oro MD (Physician)                 Arrhythmia Clinic    Thank you, Dr. Elie Cummings MD, for asking the [facility] Arrhythmia team to evaluate Yo Mcdonough in consultation for atrial fibrillation      Assessment:     67 year old male with history of non-obstructive CAD, DORITA on CPAP, HTN, DM2 and persistent atrial fibrillation presents for evaluation.     Plan:     Atrial fibrillation - persistent and symptomatic. He has a chads2-vasc of 4 and is appropriately anticoagulated on Pradaxa.  He is directly symptomatic with tachycardia and fatigue.  He is now post cardioversion without recurrence in the last few months, controlled on sotalol.  However, he is bradycardic on sotalol and feels this is limiting his quality of life.  I reviewed the pathophysiology of atrial fibrillation in detail with Yo.  Treatment options, including AAD therapy or ablation were reviewed.  Ablation procedure discussed in detail.  Given his moderate LA enlargement and persistent state I quoted a no better than 75% chance of success with a single procedure.  Risks, including but not limited to: bleeding, bruising, pain, infection, cardiac perforation, heart attack, stroke, esophageal injury, PV stenosis or death were reviewed and Yo verbalized understanding.    After discussion, Yo would like to proceed with ablation, but only when he returns from [place] after the winter.  I have encouraged him to continue moderate exercise, and continue wearing CPAP.    He should remain on anticoagulation life long.    I believe a full evaluation of his newly diagnosed kidney disease is warranted prior to proceeding to ablation, and have asked him to follow up with his  internist in [place].     Leg/Buttock pain - Given risk factors and description, suspicious for peripheral vascular disease and claudication.  I have recommended at minimum ABIs and continued exercise regimen, though I think he should follow up with interventional cardiology while in [place] or when he returns to the [facility].  Continue asa daily.    Kidney disease - newly diagnosed.  His QTc on sotalol remains stable given his RBBB.  However, this should be more fully evaluated and his sotalol dose should be followed closely.  I have asked him to find a cardiologist/EP in [place] to follow this.  Additionally, he needs continued follow up with internal medicine for kidney issues.    Can follow up with myself when he returns from [place].     Current History:   Yo Mcdonough is a 67 y.o. male with a history of HTN, non-obstructive CAD, DM2, DORITA on CPAP and persistent atrial fibrillation.  He underwent cardioversion in July on sotalol and has maintained sinus rhythm since that time.  However, his sotalol is leading to bradycardia and this is limiting his activities.  His dose has been cut, but he still has bradycardia in the 50s.  He denies any chest pains or current shortness of breath, however, he admits to claudication type symptoms with leg/buttock pain walking approximately 200 feet.  This is reproducible.  Given his AF and AAD therapy symptoms, he is referred today for further evaluation.  Additionally, he was recently found to have an elevated creatinine and this is being worked up by PCP.  He is leaving for [place] today through the winter.    Past Medical History:     Past Medical History:   Diagnosis Date   ? Atrial fibrillation    ? Reyes's esophagus    ? Diabetes mellitus, type II    ? GERD (gastroesophageal reflux disease)    ? High cholesterol    ? Hypertension    ? Sleep apnea, obstructive     CPAP       Past Surgical History:     Past Surgical History:   Procedure Laterality Date   ?  CARDIAC CATHETERIZATION  06/14/2017    right and left heart cath   ? CARDIAC CATHETERIZATION N/A 6/14/2017    Procedure: Coronary Angiogram;  Surgeon: Ana Rosa Miller MD;  Location: [facility] Cath [facility];  Service:    ? CARDIOVERSION     ? CARDIOVERSION  07/13/2017   ? MA ESOPHAGOSCOPY FLEXIBLE TRANSORAL WITH BIOPSY      Description: Esophagoscopy With Biopsy;  Recorded: 07/24/2013;   ? MA L HRT CATH W/NJX L VENTRICULOGRAPHY IMG S&I N/A 6/14/2017    Procedure: Left Heart Catheterization with Left Ventriculogram;  Surgeon: Ana Rosa Miller MD;  Location: [facility];  Service: Cardiology   ? MA RIGHT HEART CATH O2 SATURATION & CARDIAC OUTPUT N/A 6/14/2017    Procedure: Right Heart Catheterization;  Surgeon: Ana Rosa Miller MD;  Location: [facility];  Service: Cardiology       Family History:     Family History   Problem Relation Age of Onset   ? Valvular heart disease Father    ? Pacemaker Father        Social History:    reports that he quit smoking about 13 years ago. He has a 25.00 pack-year smoking history. He uses smokeless tobacco. He reports that he does not drink alcohol or use illicit drugs.    Meds:     Current Outpatient Prescriptions:   ?  atorvastatin (LIPITOR) 40 MG tablet, Take 1 tablet (40 mg total) by mouth at bedtime., Disp: 90 tablet, Rfl: 1  ?  canagliflozin (INVOKANA) 100 mg Tab, Take 100 mg by mouth Daily before breakfast. , Disp: , Rfl:   ?  dabigatran etexilate (PRADAXA) 150 mg capsu, Take 150 mg by mouth 2 (two) times a day., Disp: , Rfl:   ?  glimepiride (AMARYL) 4 MG tablet, Take 1 tablet by mouth daily., Disp: , Rfl:   ?  lisinopril-hydrochlorothiazide (PRINZIDE,ZESTORETIC) 10-12.5 mg per tablet, Take 1 tablet by mouth daily., Disp: , Rfl:   ?  omeprazole (PRILOSEC) 20 MG capsule, Take 20 mg by mouth Daily before breakfast., Disp: , Rfl:   ?  sotalol (BETAPACE) 120 MG tablet, TAKE ONE-HALF TABLET BY MOUTH TWICE DAILY, Disp: 90 tablet, Rfl: 3  ?  traZODone (DESYREL) 150 MG  "tablet, Take 150 mg by mouth at bedtime., Disp: , Rfl:   ?  metFORMIN (GLUCOPHAGE) 1000 MG tablet, Take 1,000 mg by mouth 2 (two) times a day with meals., Disp: , Rfl:     Allergies:   Betadine [povidone-iodine] and Dye    Review of Systems:   A full 12 point review of systems without pertinent positives except as per HPI or below.        Objective:      Physical Exam  Weight: Weight: (!) 226 lb (102.5 kg)  Body mass index is 33.37 kg/(m^2).  /70 (Patient Site: Left Arm, Patient Position: Sitting, Cuff Size: Adult Large)  Pulse (!) 52  Resp 16  Ht 5' 9\" (1.753 m)  Wt (!) 226 lb (102.5 kg)  BMI 33.37 kg/m2    General Appearance:   Nad, alert and oriented x 3   HEENT:  Mmm, perrl   Neck: No goiter noted   Chest: No deformities noted   Lungs:   Clear bilaterally   Cardiovascular:   Venkat, regular   Abdomen:  Soft and non-tender   Extremities: No clubbing or edema   Skin: No rashes                 Cardiographics  ECG 10/19/17 personally reviewed - sinus venkat with RBBB  Echo 2017 personally reviewed - EF 50-55% with moderate LA enlargement and no valvular abnormalities  Angio 2017 personally reviewed - non-obstructive CAD  Note from Sole Resendez NP personally reviewed 10/17    Imaging  None    Lab Review   Lab Results   Component Value Date     10/19/2017    K 5.5 (H) 10/19/2017     10/19/2017    CO2 16 (L) 10/19/2017    BUN 27 (H) 10/19/2017    CREATININE 2.44 (H) 10/19/2017    CALCIUM 9.4 10/19/2017     Lab Results   Component Value Date    WBC 5.6 06/14/2017    HGB 11.9 (L) 06/14/2017    HCT 34.9 (L) 06/14/2017    MCV 95 06/14/2017     (L) 06/14/2017     Lab Results   Component Value Date    CHOL 106 06/14/2017    TRIG 190 (H) 06/14/2017    HDL 23 (L) 06/14/2017         Alie Oro MD  [facility] Cardiology, Electrophysiology         "